# Patient Record
Sex: FEMALE | Race: BLACK OR AFRICAN AMERICAN | NOT HISPANIC OR LATINO | Employment: FULL TIME
[De-identification: names, ages, dates, MRNs, and addresses within clinical notes are randomized per-mention and may not be internally consistent; named-entity substitution may affect disease eponyms.]

---

## 2017-09-10 ENCOUNTER — HEALTH MAINTENANCE LETTER (OUTPATIENT)
Age: 42
End: 2017-09-10

## 2019-11-19 ENCOUNTER — APPOINTMENT (OUTPATIENT)
Dept: ULTRASOUND IMAGING | Facility: CLINIC | Age: 44
End: 2019-11-19
Attending: EMERGENCY MEDICINE
Payer: COMMERCIAL

## 2019-11-19 ENCOUNTER — HOSPITAL ENCOUNTER (EMERGENCY)
Facility: CLINIC | Age: 44
Discharge: HOME | End: 2019-11-20
Attending: EMERGENCY MEDICINE | Admitting: EMERGENCY MEDICINE
Payer: COMMERCIAL

## 2019-11-19 ENCOUNTER — APPOINTMENT (OUTPATIENT)
Dept: GENERAL RADIOLOGY | Facility: CLINIC | Age: 44
End: 2019-11-19
Attending: EMERGENCY MEDICINE
Payer: COMMERCIAL

## 2019-11-19 DIAGNOSIS — E87.6 HYPOKALEMIA: ICD-10-CM

## 2019-11-19 DIAGNOSIS — M54.50 ACUTE BILATERAL LOW BACK PAIN WITHOUT SCIATICA: ICD-10-CM

## 2019-11-19 DIAGNOSIS — R60.0 BILATERAL LOWER EXTREMITY EDEMA: ICD-10-CM

## 2019-11-19 LAB
ANION GAP SERPL CALCULATED.3IONS-SCNC: 6 MMOL/L (ref 3–14)
BASOPHILS # BLD AUTO: 0 10E9/L (ref 0–0.2)
BASOPHILS NFR BLD AUTO: 0.3 %
BUN SERPL-MCNC: 9 MG/DL (ref 7–30)
CALCIUM SERPL-MCNC: 8.5 MG/DL (ref 8.5–10.1)
CHLORIDE SERPL-SCNC: 110 MMOL/L (ref 94–109)
CO2 SERPL-SCNC: 24 MMOL/L (ref 20–32)
CREAT SERPL-MCNC: 0.52 MG/DL (ref 0.52–1.04)
D DIMER PPP FEU-MCNC: 1 UG/ML FEU (ref 0–0.5)
DIFFERENTIAL METHOD BLD: ABNORMAL
EOSINOPHIL # BLD AUTO: 0.1 10E9/L (ref 0–0.7)
EOSINOPHIL NFR BLD AUTO: 2.9 %
ERYTHROCYTE [DISTWIDTH] IN BLOOD BY AUTOMATED COUNT: 13.8 % (ref 10–15)
GFR SERPL CREATININE-BSD FRML MDRD: >90 ML/MIN/{1.73_M2}
GLUCOSE SERPL-MCNC: 87 MG/DL (ref 70–99)
HCT VFR BLD AUTO: 32.4 % (ref 35–47)
HGB BLD-MCNC: 10.8 G/DL (ref 11.7–15.7)
IMM GRANULOCYTES # BLD: 0 10E9/L (ref 0–0.4)
IMM GRANULOCYTES NFR BLD: 0.6 %
LYMPHOCYTES # BLD AUTO: 1.6 10E9/L (ref 0.8–5.3)
LYMPHOCYTES NFR BLD AUTO: 45.3 %
MCH RBC QN AUTO: 30.7 PG (ref 26.5–33)
MCHC RBC AUTO-ENTMCNC: 33.3 G/DL (ref 31.5–36.5)
MCV RBC AUTO: 92 FL (ref 78–100)
MONOCYTES # BLD AUTO: 0.3 10E9/L (ref 0–1.3)
MONOCYTES NFR BLD AUTO: 7.6 %
NEUTROPHILS # BLD AUTO: 1.5 10E9/L (ref 1.6–8.3)
NEUTROPHILS NFR BLD AUTO: 43.3 %
NRBC # BLD AUTO: 0 10*3/UL
NRBC BLD AUTO-RTO: 0 /100
NT-PROBNP SERPL-MCNC: 57 PG/ML (ref 0–450)
PLATELET # BLD AUTO: 275 10E9/L (ref 150–450)
POTASSIUM SERPL-SCNC: 3 MMOL/L (ref 3.4–5.3)
RBC # BLD AUTO: 3.52 10E12/L (ref 3.8–5.2)
SODIUM SERPL-SCNC: 140 MMOL/L (ref 133–144)
TROPONIN I SERPL-MCNC: <0.015 UG/L (ref 0–0.04)
WBC # BLD AUTO: 3.4 10E9/L (ref 4–11)

## 2019-11-19 PROCEDURE — 71046 X-RAY EXAM CHEST 2 VIEWS: CPT

## 2019-11-19 PROCEDURE — 85025 COMPLETE CBC W/AUTO DIFF WBC: CPT | Performed by: EMERGENCY MEDICINE

## 2019-11-19 PROCEDURE — 96366 THER/PROPH/DIAG IV INF ADDON: CPT

## 2019-11-19 PROCEDURE — 93970 EXTREMITY STUDY: CPT

## 2019-11-19 PROCEDURE — 83880 ASSAY OF NATRIURETIC PEPTIDE: CPT | Performed by: EMERGENCY MEDICINE

## 2019-11-19 PROCEDURE — 25000128 H RX IP 250 OP 636: Performed by: EMERGENCY MEDICINE

## 2019-11-19 PROCEDURE — 84484 ASSAY OF TROPONIN QUANT: CPT | Performed by: EMERGENCY MEDICINE

## 2019-11-19 PROCEDURE — 25000132 ZZH RX MED GY IP 250 OP 250 PS 637: Performed by: EMERGENCY MEDICINE

## 2019-11-19 PROCEDURE — 80048 BASIC METABOLIC PNL TOTAL CA: CPT | Performed by: EMERGENCY MEDICINE

## 2019-11-19 PROCEDURE — 96365 THER/PROPH/DIAG IV INF INIT: CPT | Mod: 59

## 2019-11-19 PROCEDURE — 73030 X-RAY EXAM OF SHOULDER: CPT | Mod: RT

## 2019-11-19 PROCEDURE — 85379 FIBRIN DEGRADATION QUANT: CPT | Performed by: EMERGENCY MEDICINE

## 2019-11-19 PROCEDURE — 93005 ELECTROCARDIOGRAM TRACING: CPT

## 2019-11-19 PROCEDURE — 99285 EMERGENCY DEPT VISIT HI MDM: CPT | Mod: 25

## 2019-11-19 RX ORDER — LEVETIRACETAM 500 MG/1
1000 TABLET ORAL 2 TIMES DAILY
COMMUNITY

## 2019-11-19 RX ORDER — POTASSIUM CL/LIDO/0.9 % NACL 10MEQ/0.1L
10 INTRAVENOUS SOLUTION, PIGGYBACK (ML) INTRAVENOUS ONCE
Status: COMPLETED | OUTPATIENT
Start: 2019-11-19 | End: 2019-11-20

## 2019-11-19 RX ORDER — LISINOPRIL 5 MG/1
5 TABLET ORAL DAILY
COMMUNITY

## 2019-11-19 RX ORDER — IBUPROFEN 600 MG/1
600 TABLET, FILM COATED ORAL ONCE
Status: COMPLETED | OUTPATIENT
Start: 2019-11-19 | End: 2019-11-19

## 2019-11-19 RX ORDER — POTASSIUM CHLORIDE 1.5 G/1.58G
40 POWDER, FOR SOLUTION ORAL ONCE
Status: COMPLETED | OUTPATIENT
Start: 2019-11-19 | End: 2019-11-19

## 2019-11-19 RX ADMIN — Medication 10 MEQ: at 23:10

## 2019-11-19 RX ADMIN — IBUPROFEN 600 MG: 600 TABLET ORAL at 23:10

## 2019-11-19 RX ADMIN — POTASSIUM CHLORIDE 40 MEQ: 1.5 POWDER, FOR SOLUTION ORAL at 23:10

## 2019-11-19 ASSESSMENT — MIFFLIN-ST. JEOR: SCORE: 1638.79

## 2019-11-19 ASSESSMENT — ENCOUNTER SYMPTOMS
FREQUENCY: 1
DYSURIA: 0
FEVER: 0
BACK PAIN: 1

## 2019-11-19 NOTE — ED AVS SNAPSHOT
Emergency Department  [address] [facility] [address]  Phone:  [phone]  Fax:  [phone]                                    Bernarda Leblanc   MRN: [id number]    Department:   Emergency Department   Date of Visit:  11/19/2019           After Visit Summary Signature Page    I have received my discharge instructions, and my questions have been answered. I have discussed any challenges I see with this plan with the nurse or doctor.    ..........................................................................................................................................  Patient/Patient Representative Signature      ..........................................................................................................................................  Patient Representative Print Name and Relationship to Patient    ..................................................               ................................................  Date                                   Time    ..........................................................................................................................................  Reviewed by Signature/Title    ...................................................              ..............................................  Date                                               Time          22EPIC Rev 08/18

## 2019-11-20 ENCOUNTER — APPOINTMENT (OUTPATIENT)
Dept: CT IMAGING | Facility: CLINIC | Age: 44
End: 2019-11-20
Attending: EMERGENCY MEDICINE
Payer: COMMERCIAL

## 2019-11-20 VITALS
SYSTOLIC BLOOD PRESSURE: 136 MMHG | RESPIRATION RATE: 16 BRPM | TEMPERATURE: 98.2 F | BODY MASS INDEX: 36.65 KG/M2 | HEIGHT: 65 IN | WEIGHT: 220 LBS | DIASTOLIC BLOOD PRESSURE: 79 MMHG | OXYGEN SATURATION: 98 % | HEART RATE: 98 BPM

## 2019-11-20 LAB
ALBUMIN UR-MCNC: NEGATIVE MG/DL
APPEARANCE UR: CLEAR
BILIRUB UR QL STRIP: NEGATIVE
COLOR UR AUTO: YELLOW
GLUCOSE UR STRIP-MCNC: NEGATIVE MG/DL
HGB UR QL STRIP: NEGATIVE
INTERPRETATION ECG - MUSE: NORMAL
KETONES UR STRIP-MCNC: NEGATIVE MG/DL
LEUKOCYTE ESTERASE UR QL STRIP: NEGATIVE
NITRATE UR QL: NEGATIVE
PH UR STRIP: 5.5 PH (ref 5–7)
SOURCE: NORMAL
SP GR UR STRIP: 1.02 (ref 1–1.03)
UROBILINOGEN UR STRIP-MCNC: NORMAL MG/DL (ref 0–2)

## 2019-11-20 PROCEDURE — 25000128 H RX IP 250 OP 636: Performed by: EMERGENCY MEDICINE

## 2019-11-20 PROCEDURE — 71275 CT ANGIOGRAPHY CHEST: CPT

## 2019-11-20 PROCEDURE — 25000125 ZZHC RX 250: Performed by: EMERGENCY MEDICINE

## 2019-11-20 PROCEDURE — 81003 URINALYSIS AUTO W/O SCOPE: CPT | Performed by: EMERGENCY MEDICINE

## 2019-11-20 RX ORDER — FUROSEMIDE 20 MG
20 TABLET ORAL DAILY
Qty: 3 TABLET | Refills: 0 | Status: SHIPPED | OUTPATIENT
Start: 2019-11-20 | End: 2022-02-21

## 2019-11-20 RX ORDER — IOPAMIDOL 755 MG/ML
77 INJECTION, SOLUTION INTRAVASCULAR ONCE
Status: COMPLETED | OUTPATIENT
Start: 2019-11-20 | End: 2019-11-20

## 2019-11-20 RX ORDER — POTASSIUM CHLORIDE 750 MG/1
10 TABLET, EXTENDED RELEASE ORAL 2 TIMES DAILY
Qty: 10 TABLET | Refills: 0 | Status: SHIPPED | OUTPATIENT
Start: 2019-11-20 | End: 2019-11-25

## 2019-11-20 RX ADMIN — IOPAMIDOL 77 ML: 755 INJECTION, SOLUTION INTRAVENOUS at 02:13

## 2019-11-20 RX ADMIN — SODIUM CHLORIDE 100 ML: 9 INJECTION, SOLUTION INTRAVENOUS at 02:14

## 2019-11-20 NOTE — ED PROVIDER NOTES
"History     Chief Complaint:  Back Pain and Leg Swelling     HPI  Bernarda Leblanc is a 44 year old female with history of hypertension who presents to the emergency department today with back pain and lag swelling. The patient states that for the last few weeks she has had increased leg swelling, which she was seen for on 10/25/19 with results below. She states since the swelling has decreased some, but the pain has increased with her leg locking up with a \"jannette horse\" feeling. She states has also been having bilateral lower back pain that has made it difficult for her to move. She states her job does not make her bend over, but her legs are always down. She states she does not wear compression leggings. She states she takes Lisinopril and Amlodipine for her hypertension, but has not taken the amlodipine for the last week as she has to refill her prescription. She states some shoulder soreness as well. She states she has alternated ibuprofen and tylenol for pain. She states she has also been wheezing of late and had some urinary frequency. She states she has talked to her PCP at [facility] about fluid removal but has no appointment set up as of yet due to difficulties getting work off. She denies fever, dysuria or foul smelling urine.     Results ED visit 10/25/19:  X-ray chest 2 views:   1. The lungs and costophrenic angles are clear.  Heart size and pulmonary vascularity are within normal limits.  There is no evidence of pneumothorax or pleural effusion. Bony thorax is unremarkable.     Laboratory Studies:   CBC with Differential: WBC 3.8 (WNL), Hgb 10.9 (L), Plts 263 (WNL), RBC 3.50(L), HCT 32.6 (L) o/w WNL  Basic Metabolic Panel: Cr. 0.57 (WNL), o/w WNL  Magnesium: 1.7 (WNL)  1306 Troponin: <0.01 (WNL)  1558 Troponin: <0.01 (WNL)  D-Dimer: 0.37 (WNL)  BNP: <10 (WNL)  TSH: 0.75   Type and Screen: O positive, Antibody negative     Allergies:  No Known Allergies     Medications:  " "  Keppra  Lisinopril  Flexeril  ibuprofen  Amlodipine    Past Medical History:    Seizures  hypertension  Opioid dependence    Past Surgical History:    Hysterectomy  Dental surgery  [place] teeth extraction  Gastric bypass  Tubal ligation    Family History:    Mother - seizures    Social History:  The patient was accompanied to the ED alone.  Smoking Status: Current  Smokeless Tobacco: Never  Alcohol Use: No   Drug Use: No   PCP: Austin Ash   Marital Status:  Single    Review of Systems   Constitutional: Negative for fever.   Cardiovascular: Positive for leg swelling.   Genitourinary: Positive for frequency. Negative for dysuria.   Musculoskeletal: Positive for back pain.   All other systems reviewed and are negative.         Physical Exam     Patient Vitals for the past 24 hrs:   BP Temp Temp src Pulse Resp SpO2 Height Weight   11/19/19 2007 (!) 142/80 98.2  F (36.8  C) Oral 98 18 97 % 1.651 m (5' 5\") 99.8 kg (220 lb)        Physical Exam  General: Patient is alert and normal appearing.  HEENT: Head atraumatic    Eyes: pupils equal and reactive. Conjunctiva clear   Nares: patent   Oropharynx: no lesions, uvula midline, no palatal draping, normal voice, no trismus  Neck: Supple without lymphadenopathy, no meningismus  Chest: Heart regular rate and rhythm.   Lungs: Equal clear to auscultation with no wheeze or rales  Abdomen: Soft, non tender, nondistended, normal bowel sounds  Back: No costovertebral angle tenderness, no midline C, T or L spine tenderness  Neuro: Grossly nonfocal, normal speech, strength equal bilaterally, CN 2-12 intact  Extremities: No deformities, equal radial and DP pulses. No clubbing, cyanosis.  Extremity that is tense but neurovascularly intact distally and compartments are soft.  Skin: Warm and dry with no rash.       Emergency Department Course     ECG:  Indication: leg swelling  Time: 2020  Vent. Rate 85 bpm. NY interval 156. QRS duration 94. QT/QTc 388/461. P-R-T axis 49 -8 2. Sinus " rhythm. Minimal voltage criteria for LVH, may be normal variant. Borderline ECG.     Imaging:  Radiology results were communicated with the patient who voiced understanding of the findings.    CT Chest Pulmonary embolism w/ contrast   IMPRESSION:  1. No visualized pulmonary embolus.  2. No evidence of active pulmonary disease, as per radiology.     US Lower Extremity Venous Duplex, bilateral:   IMPRESSION: No evidence of thrombus in the major veins of bilateral  lower extremities, as per radiology.    XR Shoulder right G/E, 3 views:    IMPRESSION: No visualized acute fracture, malalignment or other acute  osseous abnormality of the right shoulder, as per radiology.     XR Chest 2 views:   IMPRESSION: No evidence of active cardiopulmonary disease, as per radiology.     Laboratory:  Laboratory results were communicated with the patient who voiced understanding of the findings.    CBC: WBC: 3.4 (L), HGB: 10.8 (L), PLT: 275  BMP: Glucose: 87, Potassium: 3.0 (L), Chloride: 110 (H), o/w WNL (Creatinine: 0.52)  2016 Troponin: <0.015  D-dimer: 1.0 (H)  BNP: 57   UA: Negative     Interventions:  2310 Ibuprofen 600 mg PO  2310 Potassium chloride 40 mEq PO  2310 Potassium chloride with lidocaine 10 mEq IV    Emergency Department Course:  Nursing notes and vitals reviewed. (10:47 PM) I performed an exam of the patient as documented above.     IV inserted. Blood drawn. This was sent to the lab for further testing, results above.     Medicine administered as documented above.    The patient was sent for US, XR and CT while in the emergency department, results above.     0014 I rechecked the patient and discussed the results of their workup thus far.     Findings and plan explained to the Patient. Patient discharged home with instructions regarding supportive care, medications, and reasons to return. The importance of close follow-up was reviewed. The patient was prescribed Lasix and Potassium Chloride.    Impression & Plan   "    Medical Decision Making:  Patient is a 44-year-old female with multiple complaints including intermittent myalgias, lower extremity edema, bilateral low back pain and dysuria.  In addition patient complains of right shoulder pain.  She is hemodynamically stable and afebrile.  She sits working at a desk with her feet down all day.  She does have bilateral lower extremity edema.  She had a recent work-up in outside hospital that was relatively unremarkable and her d-dimer at that time was normal but today it is elevated.  Due to this she was sent for bilateral venous Dopplers which were negative for DVT.  As a result of her right shoulder pain as well she was sent for CT scan to rule out PE and this was thankfully negative as well.  Patient's EKG without evidence of ischemia.  In addition patient's BNP and troponin were negative.  Do not suspect congestive heart failure.  I feel her edema is likely dependent.  She was noted to be hypokalemic which may be contributing to her muscle spasms and myalgias.  She was given potassium repletion here in the emergency department.  Patient was very adamant that she felt she needed something to help her get rid of the \"water weight\".  She was given compression with Ace wraps here she states she cannot afford compression stockings at this time.  She is encouraged to keep her feet elevated.  She was given 3 days of 20 mg Lasix as well as potassium repletion.  No evidence of urinary tract infection.  I do not suspect kidney stone or other intra-abdominal emergency.  Follow-up with her primary doctor is encouraged.  Return precautions the emergency department reviewed at length.  Is with reasonable clinical certainty that I feel the patient is safe for discharge at this time.  She is agreeable with the plan and all questions and concerns addressed.      Diagnosis:    ICD-10-CM    1. Bilateral lower extremity edema R60.0 UA reflex to Microscopic and Culture   2. Acute bilateral low " back pain without sciatica M54.5    3. Hypokalemia E87.6       Disposition:  Discharged to home.    Discharge Medications:  New Prescriptions    FUROSEMIDE (LASIX) 20 MG TABLET    Take 1 tablet (20 mg) by mouth daily for 3 days    POTASSIUM CHLORIDE ER (K-DUR/KLOR-CON M) 10 MEQ CR TABLET    Take 1 tablet (10 mEq) by mouth 2 times daily for 5 days      Scribe Disclosure:  I, Forest Cartagena, am serving as a scribe at 10:47 PM on 11/19/2019 to document services personally performed by Jenny Frederick MD based on my observations and the provider's statements to me.      11/19/2019    EMERGENCY DEPARTMENT       Jenny Frederick MD  11/20/19 0344

## 2019-11-20 NOTE — ED TRIAGE NOTES
"Patient presents to the ED with complaints of back and bilateral flank pain, leg swelling, hip pain and wheezing when she lays down at night. Patient is on BP medications but has not taken them for a week or two since she has no way to get to the store to pick them up. She was recently seen at [facility] for same symptoms but says that \"they didn't do anything about it.   "

## 2022-02-11 ENCOUNTER — APPOINTMENT (OUTPATIENT)
Dept: ULTRASOUND IMAGING | Facility: CLINIC | Age: 47
End: 2022-02-11
Attending: EMERGENCY MEDICINE
Payer: COMMERCIAL

## 2022-02-11 ENCOUNTER — APPOINTMENT (OUTPATIENT)
Dept: CT IMAGING | Facility: CLINIC | Age: 47
End: 2022-02-11
Attending: EMERGENCY MEDICINE
Payer: COMMERCIAL

## 2022-02-11 ENCOUNTER — HOSPITAL ENCOUNTER (EMERGENCY)
Facility: CLINIC | Age: 47
Discharge: HOME | End: 2022-02-11
Attending: EMERGENCY MEDICINE | Admitting: EMERGENCY MEDICINE
Payer: COMMERCIAL

## 2022-02-11 VITALS
OXYGEN SATURATION: 100 % | HEIGHT: 65 IN | RESPIRATION RATE: 16 BRPM | SYSTOLIC BLOOD PRESSURE: 134 MMHG | TEMPERATURE: 98 F | DIASTOLIC BLOOD PRESSURE: 94 MMHG | BODY MASS INDEX: 36.65 KG/M2 | WEIGHT: 220 LBS | HEART RATE: 84 BPM

## 2022-02-11 DIAGNOSIS — K80.20 CALCULUS OF GALLBLADDER WITHOUT CHOLECYSTITIS WITHOUT OBSTRUCTION: ICD-10-CM

## 2022-02-11 DIAGNOSIS — N83.202 LEFT OVARIAN CYST: ICD-10-CM

## 2022-02-11 DIAGNOSIS — R10.9 RIGHT SIDED ABDOMINAL PAIN: ICD-10-CM

## 2022-02-11 LAB
ALBUMIN SERPL-MCNC: 3.2 G/DL (ref 3.4–5)
ALBUMIN UR-MCNC: NEGATIVE MG/DL
ALP SERPL-CCNC: 79 U/L (ref 40–150)
ALT SERPL W P-5'-P-CCNC: 25 U/L (ref 0–50)
ANION GAP SERPL CALCULATED.3IONS-SCNC: 7 MMOL/L (ref 3–14)
APPEARANCE UR: CLEAR
AST SERPL W P-5'-P-CCNC: 37 U/L (ref 0–45)
BASOPHILS # BLD AUTO: 0 10E3/UL (ref 0–0.2)
BASOPHILS NFR BLD AUTO: 1 %
BILIRUB SERPL-MCNC: 0.1 MG/DL (ref 0.2–1.3)
BILIRUB UR QL STRIP: NEGATIVE
BUN SERPL-MCNC: 7 MG/DL (ref 7–30)
CALCIUM SERPL-MCNC: 8.6 MG/DL (ref 8.5–10.1)
CHLORIDE BLD-SCNC: 107 MMOL/L (ref 94–109)
CO2 SERPL-SCNC: 24 MMOL/L (ref 20–32)
COLOR UR AUTO: NORMAL
CREAT SERPL-MCNC: 0.49 MG/DL (ref 0.52–1.04)
EOSINOPHIL # BLD AUTO: 0.1 10E3/UL (ref 0–0.7)
EOSINOPHIL NFR BLD AUTO: 3 %
ERYTHROCYTE [DISTWIDTH] IN BLOOD BY AUTOMATED COUNT: 13.6 % (ref 10–15)
GFR SERPL CREATININE-BSD FRML MDRD: >90 ML/MIN/1.73M2
GLUCOSE BLD-MCNC: 104 MG/DL (ref 70–99)
GLUCOSE UR STRIP-MCNC: NEGATIVE MG/DL
HCT VFR BLD AUTO: 33.6 % (ref 35–47)
HGB BLD-MCNC: 11.6 G/DL (ref 11.7–15.7)
HGB UR QL STRIP: NEGATIVE
IMM GRANULOCYTES # BLD: 0 10E3/UL
IMM GRANULOCYTES NFR BLD: 0 %
KETONES UR STRIP-MCNC: NEGATIVE MG/DL
LEUKOCYTE ESTERASE UR QL STRIP: NEGATIVE
LIPASE SERPL-CCNC: 80 U/L (ref 73–393)
LYMPHOCYTES # BLD AUTO: 1.6 10E3/UL (ref 0.8–5.3)
LYMPHOCYTES NFR BLD AUTO: 52 %
MCH RBC QN AUTO: 31.6 PG (ref 26.5–33)
MCHC RBC AUTO-ENTMCNC: 34.5 G/DL (ref 31.5–36.5)
MCV RBC AUTO: 92 FL (ref 78–100)
MONOCYTES # BLD AUTO: 0.4 10E3/UL (ref 0–1.3)
MONOCYTES NFR BLD AUTO: 11 %
NEUTROPHILS # BLD AUTO: 1 10E3/UL (ref 1.6–8.3)
NEUTROPHILS NFR BLD AUTO: 33 %
NITRATE UR QL: NEGATIVE
NRBC # BLD AUTO: 0 10E3/UL
NRBC BLD AUTO-RTO: 0 /100
PH UR STRIP: 5.5 [PH] (ref 5–7)
PLATELET # BLD AUTO: 256 10E3/UL (ref 150–450)
POTASSIUM BLD-SCNC: 3.5 MMOL/L (ref 3.4–5.3)
PROT SERPL-MCNC: 6.3 G/DL (ref 6.8–8.8)
RBC # BLD AUTO: 3.67 10E6/UL (ref 3.8–5.2)
RBC URINE: <1 /HPF
SODIUM SERPL-SCNC: 138 MMOL/L (ref 133–144)
SP GR UR STRIP: 1.01 (ref 1–1.03)
SQUAMOUS EPITHELIAL: <1 /HPF
UROBILINOGEN UR STRIP-MCNC: NORMAL MG/DL
WBC # BLD AUTO: 3.2 10E3/UL (ref 4–11)
WBC URINE: <1 /HPF

## 2022-02-11 PROCEDURE — 83690 ASSAY OF LIPASE: CPT | Performed by: EMERGENCY MEDICINE

## 2022-02-11 PROCEDURE — 81001 URINALYSIS AUTO W/SCOPE: CPT | Performed by: EMERGENCY MEDICINE

## 2022-02-11 PROCEDURE — 74176 CT ABD & PELVIS W/O CONTRAST: CPT

## 2022-02-11 PROCEDURE — 99285 EMERGENCY DEPT VISIT HI MDM: CPT | Mod: 25

## 2022-02-11 PROCEDURE — 36415 COLL VENOUS BLD VENIPUNCTURE: CPT | Performed by: EMERGENCY MEDICINE

## 2022-02-11 PROCEDURE — 96374 THER/PROPH/DIAG INJ IV PUSH: CPT

## 2022-02-11 PROCEDURE — 80053 COMPREHEN METABOLIC PANEL: CPT | Performed by: EMERGENCY MEDICINE

## 2022-02-11 PROCEDURE — 96375 TX/PRO/DX INJ NEW DRUG ADDON: CPT

## 2022-02-11 PROCEDURE — 85048 AUTOMATED LEUKOCYTE COUNT: CPT | Performed by: EMERGENCY MEDICINE

## 2022-02-11 PROCEDURE — 76705 ECHO EXAM OF ABDOMEN: CPT

## 2022-02-11 PROCEDURE — 250N000011 HC RX IP 250 OP 636: Performed by: EMERGENCY MEDICINE

## 2022-02-11 RX ORDER — KETOROLAC TROMETHAMINE 15 MG/ML
15 INJECTION, SOLUTION INTRAMUSCULAR; INTRAVENOUS ONCE
Status: COMPLETED | OUTPATIENT
Start: 2022-02-11 | End: 2022-02-11

## 2022-02-11 RX ORDER — HYDROMORPHONE HYDROCHLORIDE 1 MG/ML
0.5 INJECTION, SOLUTION INTRAMUSCULAR; INTRAVENOUS; SUBCUTANEOUS ONCE
Status: COMPLETED | OUTPATIENT
Start: 2022-02-11 | End: 2022-02-11

## 2022-02-11 RX ORDER — OXYCODONE AND ACETAMINOPHEN 5; 325 MG/1; MG/1
1 TABLET ORAL EVERY 6 HOURS PRN
Qty: 6 TABLET | Refills: 0 | Status: SHIPPED | OUTPATIENT
Start: 2022-02-11 | End: 2022-02-14

## 2022-02-11 RX ADMIN — HYDROMORPHONE HYDROCHLORIDE 0.5 MG: 1 INJECTION, SOLUTION INTRAMUSCULAR; INTRAVENOUS; SUBCUTANEOUS at 22:11

## 2022-02-11 RX ADMIN — KETOROLAC TROMETHAMINE 15 MG: 15 INJECTION, SOLUTION INTRAMUSCULAR; INTRAVENOUS at 20:57

## 2022-02-11 ASSESSMENT — ENCOUNTER SYMPTOMS
FREQUENCY: 1
VOMITING: 0
FLANK PAIN: 1
ABDOMINAL PAIN: 0
FEVER: 0
CHILLS: 0
SHORTNESS OF BREATH: 0

## 2022-02-11 ASSESSMENT — MIFFLIN-ST. JEOR: SCORE: 1628.79

## 2022-02-11 NOTE — LETTER
February 11, 2022      To Whom It May Concern:      Bernarda Leblanc was seen in our Emergency Department today, 02/11/22.  I expect her condition to improve over the next 3-4 days.  She may return to work/school when improved.    Sincerely,        Edwardo Villarreal MD

## 2022-02-12 NOTE — ED TRIAGE NOTES
Pt here form home for back pain that radiates down her L leg. PT states her symptoms started 2-3 days ago. Pt Aox4.vss.

## 2022-02-12 NOTE — ED PROVIDER NOTES
History   Chief Complaint:  Flank pain     The history is provided by the patient.      Bernarda Leblanc is a 46 year old female with history of seizures, hypertension, secondary amenorrhea, and herniated lumbar intervertebral disc who presents with right flank pain. For a few weeks Bernarda has been having constant right side pain. She says that her pain has worsened significantly in the past two days. She has taken tylenol for the pain which helps. Pain radiates down the legs and has caused some leg swelling. She also had one episode of diarrhea and increased urinary frequency with decreased output.    Recently her aunt passed away. Bernarda expresses concern here at bedside because her aunt had the same symptoms that she presents with now. Denies constipation, fever, chills, vomiting, or abdominal pain. No chest pain or shortness of breath. No history of kidney stones.      Review of Systems   Constitutional: Negative for chills and fever.   Respiratory: Negative for shortness of breath.    Cardiovascular: Negative for chest pain.   Gastrointestinal: Negative for abdominal pain and vomiting.   Genitourinary: Positive for flank pain and frequency.   All other systems reviewed and are negative.      Allergies:  No Known Allergies    Medications:  Flexeril  Loniten  Lyrica  Senokot  Lisinopril  Vitamin D3  Lasix  Keppra  Constulose  Ambien  Therapeutic multivitamin  Cymbalta  Ferrous sulfate  Norvasc  Flovent  Albuterol    Past Medical History:     Blood pressure elevated  Substance abuse  essential hypertension  Sleep apnea, obstructive  Secondary amenorrhea  Migraine  perimenopause  Opioid dependence   Herniated lumbar intervertebral disc  TUCKER  Nicotine dependence  Mid back pain  Anemia  Vitamin D deficiency      Past Surgical History:    Hysterectomy  Tubal ligation  Gastric bypass    Social History:  Patient unaccompanied  PCP: [facility]     Physical Exam     Patient Vitals for  "the past 24 hrs:   BP Temp Temp src Pulse Resp SpO2 Height Weight   02/11/22 2330 (!) 134/94 98  F (36.7  C) Oral 84 16 100 % -- --   02/11/22 2300 129/75 -- -- -- -- 99 % -- --   02/11/22 1945 131/82 97.5  F (36.4  C) Temporal 101 16 98 % 1.651 m (5' 5\") 99.8 kg (220 lb)       Physical Exam  Eyes:  The pupils are equal and round    Conjunctivae and sclerae are normal  ENT:    The nose is normal    Pinnae are normal  CV:  Regular rate and rhythm     No edema  Resp:  Lungs are clear    Non-labored    No rales    No wheezing   GI:  Abdomen is soft with right-sided tenderness, there is no rigidity    No distension    No rebound tenderness   MS:  Normal muscular tone    No asymmetric leg swelling  Skin:  No rash or acute skin lesions noted  Neuro:   Awake, alert.      Speech is normal and fluent.    Face is symmetric.     Moves all extremities      Emergency Department Course     Imaging:  US Abdomen Limited (RUQ)   Final Result   IMPRESSION:   1.  Gallbladder is distended with several gallstones and some tumefactive sludge. No wall thickening or pericholecystic fluid.            CT Abdomen Pelvis w/o Contrast   Final Result   IMPRESSION:    1.  4 cm left ovarian cyst.      2.  Cholelithiasis      3.  Previous gastric bypass.      4.  Hysterectomy.           Report per radiology    Laboratory:  Labs Ordered and Resulted from Time of ED Arrival to Time of ED Departure   COMPREHENSIVE METABOLIC PANEL - Abnormal       Result Value    Sodium 138      Potassium 3.5      Chloride 107      Carbon Dioxide (CO2) 24      Anion Gap 7      Urea Nitrogen 7      Creatinine 0.49 (*)     Calcium 8.6      Glucose 104 (*)     Alkaline Phosphatase 79      AST 37      ALT 25      Protein Total 6.3 (*)     Albumin 3.2 (*)     Bilirubin Total 0.1 (*)     GFR Estimate >90     CBC WITH PLATELETS AND DIFFERENTIAL - Abnormal    WBC Count 3.2 (*)     RBC Count 3.67 (*)     Hemoglobin 11.6 (*)     Hematocrit 33.6 (*)     MCV 92      MCH 31.6      " MCHC 34.5      RDW 13.6      Platelet Count 256      % Neutrophils 33      % Lymphocytes 52      % Monocytes 11      % Eosinophils 3      % Basophils 1      % Immature Granulocytes 0      NRBCs per 100 WBC 0      Absolute Neutrophils 1.0 (*)     Absolute Lymphocytes 1.6      Absolute Monocytes 0.4      Absolute Eosinophils 0.1      Absolute Basophils 0.0      Absolute Immature Granulocytes 0.0      Absolute NRBCs 0.0     LIPASE - Normal    Lipase 80     ROUTINE UA WITH MICROSCOPIC REFLEX TO CULTURE - Normal    Color Urine Light Yellow      Appearance Urine Clear      Glucose Urine Negative      Bilirubin Urine Negative      Ketones Urine Negative      Specific Gravity Urine 1.012      Blood Urine Negative      pH Urine 5.5      Protein Albumin Urine Negative      Urobilinogen Urine Normal      Nitrite Urine Negative      Leukocyte Esterase Urine Negative      RBC Urine <1      WBC Urine <1      Squamous Epithelials Urine <1        Emergency Department Course:           Reviewed:  I reviewed nursing notes, vitals, past medical history and Care Everywhere    Assessments/Consults:  ED Course as of 02/12/22 0033 Fri Feb 11, 2022 2033 Obtained history and examined the patient as noted above.      Interventions:  2057 Toradol 15 mg, IV  2058 NS 1000 mL, IV  2211 Dilaudid 0.5 mg, IV    Disposition:  The patient was discharged to home.     Impression & Plan     CMS Diagnoses: None    Medical Decision Making:  Bernarda Leblanc is a 46 year old female who presents to the emergency department with right-sided abdominal pain/flank pain.  She notes that the symptoms have been ongoing for the past couple of weeks, but have worsened over the past 2 days.  On exam her pain seems to be on the lateral portion of her abdomen.  No respiratory difficulty or chest pain.  Initial concern for possible kidney stone given her description of the pain wrapping down towards her pelvis, although she also has also some pains going  down to her leg.  Laboratory work-up overall is normal with a mildly low white blood cell count consistent with prior.  CT scan of the abdomen pelvis did not reveal any ureterolithiasis; however, there was findings concerning for cholelithiasis.  Patient reexamined that she does have some tenderness in her right upper abdomen as well, so a ultrasound of her gallbladder was obtained.  Gallbladder ultrasound did not reveal any thickening of the gallbladder wall.  After pain medication here patient noted that her pain was significantly improved.  Given this, I think she is appropriate for further outpatient management of this pain.  She is given information for follow-up with surgery.  She does have a large cyst on her left ovary.  She is not having any pain in this location at this time.  I discussed with her the importance of following up for imaging for this cyst as it is quite large.  I also explained her that large cyst like this can lead to development of ovarian torsion.  She verbalized understanding and need to return for severe pain.  She is given several tablets of pain medication use at home in case the pain recurs.  She is encouraged return with any new or worrisome symptoms.      Diagnosis:    ICD-10-CM    1. Right sided abdominal pain  R10.9    2. Calculus of gallbladder without cholecystitis without obstruction  K80.20    3. Left ovarian cyst  N83.202        Discharge Medications:  Discharge Medication List as of 2/11/2022 11:47 PM      START taking these medications    Details   oxyCODONE-acetaminophen (PERCOCET) 5-325 MG tablet Take 1 tablet by mouth every 6 hours as needed for pain, Disp-6 tablet, R-0, Local Print             Scribe Disclosure:  I, True Jasso, am serving as a scribe at 8:26 PM on 2/11/2022 to document services personally performed by Edwardo Villarreal MD based on my observations and the provider's statements to me.           Edwardo Villarreal MD  02/12/22 0135

## 2022-02-21 ENCOUNTER — HOSPITAL ENCOUNTER (OUTPATIENT)
Facility: CLINIC | Age: 47
End: 2022-02-21
Attending: SURGERY | Admitting: SURGERY
Payer: COMMERCIAL

## 2022-02-21 ENCOUNTER — OFFICE VISIT (OUTPATIENT)
Dept: SURGERY | Facility: CLINIC | Age: 47
End: 2022-02-21
Payer: COMMERCIAL

## 2022-02-21 ENCOUNTER — TELEPHONE (OUTPATIENT)
Dept: SURGERY | Facility: CLINIC | Age: 47
End: 2022-02-21

## 2022-02-21 VITALS
WEIGHT: 204 LBS | HEIGHT: 65 IN | SYSTOLIC BLOOD PRESSURE: 132 MMHG | BODY MASS INDEX: 33.99 KG/M2 | DIASTOLIC BLOOD PRESSURE: 84 MMHG

## 2022-02-21 DIAGNOSIS — K80.20 CALCULUS OF GALLBLADDER WITHOUT CHOLECYSTITIS WITHOUT OBSTRUCTION: Primary | ICD-10-CM

## 2022-02-21 PROCEDURE — 99204 OFFICE O/P NEW MOD 45 MIN: CPT | Performed by: SURGERY

## 2022-02-21 RX ORDER — DULOXETIN HYDROCHLORIDE 60 MG/1
120 CAPSULE, DELAYED RELEASE ORAL DAILY
COMMUNITY

## 2022-02-21 NOTE — LETTER
"February 24, 2022        [facility]        RE:   Bernarda Leblanc 1975      Dear Colleague,    Thank you for referring your patient, Bernarda Leblanc, to [facility] at [facility]. Please see a copy of my visit note below.    This is a 46-year-old female referred by the above-mentioned provider for consultation regarding cholelithiasis.  She was recently in the emergency department whereby she was diagnosed with a urinary tract infection and likely pyelonephritis.  She was having right-sided flank pain.  She had some diarrhea associated with this.  She had some fevers or chills.  She also gives a longstanding history of postprandial right upper quadrant pain with radiation to her back.  Some associated nausea with these episodes.  These seem to be triggered by certain foods.  She has had continued discomfort since her ER visit.     PMH:   has a past medical history of Asthma, Benign essential hypertension, Disorder of thyroid, and Sleep apnea.  PSH:    has a past surgical history that includes GYN surgery and gastric bypass.  Social History:   reports that she has been smoking. She has been smoking about 0.50 packs per day. She has never used smokeless tobacco. She reports that she does not drink alcohol and does not use drugs.  Family History:  family history includes Diabetes in her daughter; Hypertension in her father and mother.  Medications/Allergies: Home medications and allergies reviewed.     ROS:  The 10 point Review of Systems is negative other than noted in the HPI.     Physical Exam:  /84   Ht 1.651 m (5' 5\")   Wt 92.5 kg (204 lb)   BMI 33.95 kg/m    GENERAL: Generally appears well.  Psych: Alert and Oriented.  Normal affect  Eyes: Sclera clear  Respiratory:  Lungs clear to ausculation bilaterally with good air excursion  Cardiovascular:  Regular Rate and Rhythm with no murmurs gallops or rubs, normal peripheral pulses  GI: Abdomen Non Distended " Soft Mild tenderness to palpation RUQ No hernias palpated.  Lymphatic/Hematologic/Immune:  No femoral or cervical lymphadenopathy.  Integumentary:  No rashes  Neurological: grossly intact     Ultrasound shows Cholelithiasis No gall bladder wall thicking  No pericholecystic fluid   All new lab and imaging data was reviewed.      Impression and Plan:  Patient is a 46 year old female with cholelithiasis.     PLAN:   I discussed the pathophysiology of gallbladder disease and complications of cholecystitis and choledocholithiasis with the patient.  Plan for elective laparoscopic cholecystectomy in the near future.  I also discussed the risks associated with the procedure including, but not limited to infection, bleeding, conversion to open, bile leak, bile duct injury, retained gallstones, pneumonia, MI, and anesthesia complications with the patient.  I also discussed if a complication did occur it may require further surgical intervention during or after the procedure. The patient indicated understanding of the discussion, asked appropriate questions, and provided consent. I provided the patient an information pamphlet. I have recommended a low fat diet and instructed the patient to go to ER if they developed persistent pain, persistent nausea and vomiting, or yellowness of skin.       Again, thank you for allowing me to participate in the care of your patient.      Sincerely,      Conner Pereira MD

## 2022-02-21 NOTE — TELEPHONE ENCOUNTER
Orders received for Lap jac with Dr. Conner Pereira.       Left message for patient to call me at her convenience to schedule surgery.     Edilma RICE    Surgery Coordinator  [facility]  [facility]  [phone]

## 2022-02-22 ENCOUNTER — TELEPHONE (OUTPATIENT)
Dept: SURGERY | Facility: CLINIC | Age: 47
End: 2022-02-22
Payer: COMMERCIAL

## 2022-02-22 DIAGNOSIS — Z11.59 ENCOUNTER FOR SCREENING FOR OTHER VIRAL DISEASES: Primary | ICD-10-CM

## 2022-02-22 RX ORDER — CEFAZOLIN SODIUM/WATER 2 G/20 ML
2 SYRINGE (ML) INTRAVENOUS
Status: CANCELLED | OUTPATIENT
Start: 2022-02-22

## 2022-02-22 RX ORDER — CEFAZOLIN SODIUM/WATER 2 G/20 ML
2 SYRINGE (ML) INTRAVENOUS SEE ADMIN INSTRUCTIONS
Status: CANCELLED | OUTPATIENT
Start: 2022-02-22

## 2022-02-22 NOTE — TELEPHONE ENCOUNTER
Type of surgery: Lap jac  Location of surgery: [facility]  Date and time of surgery: 2/28/22 at 8:30am  Surgeon: Dr. Conner Pereira  Pre-Op Appt Date: Patient to schedule  Post-Op Appt Date: Patient to schedule   Packet sent out: Yes  Pre-cert/Authorization completed:  Not Applicable  Date: 2/22/22

## 2022-02-22 NOTE — TELEPHONE ENCOUNTER
Type of surgery: Lap jac  Location of surgery: [facility]  Date and time of surgery: 2/28/22 at 8:30am  Surgeon: Dr. Conner Pereira  Pre-Op Appt Date: Patient to schedule  Post-Op Appt Date: Patient to schedule   Packet sent out: Yes  Pre-cert/Authorization completed:  Not Applicable  Date: 2/22/22

## 2022-02-23 ENCOUNTER — HOSPITAL ENCOUNTER (OUTPATIENT)
Facility: CLINIC | Age: 47
Setting detail: OBSERVATION
Discharge: HOME | End: 2022-02-26
Attending: EMERGENCY MEDICINE | Admitting: HOSPITALIST
Payer: COMMERCIAL

## 2022-02-23 ENCOUNTER — APPOINTMENT (OUTPATIENT)
Dept: ULTRASOUND IMAGING | Facility: CLINIC | Age: 47
End: 2022-02-23
Attending: EMERGENCY MEDICINE
Payer: COMMERCIAL

## 2022-02-23 DIAGNOSIS — K80.50 BILIARY COLIC: ICD-10-CM

## 2022-02-23 DIAGNOSIS — G89.18 POSTOPERATIVE PAIN: ICD-10-CM

## 2022-02-23 DIAGNOSIS — K80.20 GALLSTONES: ICD-10-CM

## 2022-02-23 DIAGNOSIS — K80.20 SYMPTOMATIC CHOLELITHIASIS: Primary | ICD-10-CM

## 2022-02-23 DIAGNOSIS — G89.4 CHRONIC PAIN SYNDROME: ICD-10-CM

## 2022-02-23 LAB
ALBUMIN SERPL-MCNC: 3.4 G/DL (ref 3.4–5)
ALBUMIN UR-MCNC: NEGATIVE MG/DL
ALP SERPL-CCNC: 88 U/L (ref 40–150)
ALT SERPL W P-5'-P-CCNC: 26 U/L (ref 0–50)
ANION GAP SERPL CALCULATED.3IONS-SCNC: 7 MMOL/L (ref 3–14)
APPEARANCE UR: CLEAR
AST SERPL W P-5'-P-CCNC: 22 U/L (ref 0–45)
BASOPHILS # BLD AUTO: 0 10E3/UL (ref 0–0.2)
BASOPHILS NFR BLD AUTO: 0 %
BILIRUB SERPL-MCNC: 0.1 MG/DL (ref 0.2–1.3)
BILIRUB UR QL STRIP: NEGATIVE
BUN SERPL-MCNC: 5 MG/DL (ref 7–30)
CALCIUM SERPL-MCNC: 8.7 MG/DL (ref 8.5–10.1)
CHLORIDE BLD-SCNC: 109 MMOL/L (ref 94–109)
CO2 SERPL-SCNC: 24 MMOL/L (ref 20–32)
COLOR UR AUTO: NORMAL
CREAT SERPL-MCNC: 0.64 MG/DL (ref 0.52–1.04)
EOSINOPHIL # BLD AUTO: 0.1 10E3/UL (ref 0–0.7)
EOSINOPHIL NFR BLD AUTO: 3 %
ERYTHROCYTE [DISTWIDTH] IN BLOOD BY AUTOMATED COUNT: 13.7 % (ref 10–15)
GFR SERPL CREATININE-BSD FRML MDRD: >90 ML/MIN/1.73M2
GLUCOSE BLD-MCNC: 69 MG/DL (ref 70–99)
GLUCOSE BLDC GLUCOMTR-MCNC: 106 MG/DL (ref 70–99)
GLUCOSE UR STRIP-MCNC: NEGATIVE MG/DL
HCT VFR BLD AUTO: 38.8 % (ref 35–47)
HGB BLD-MCNC: 12.8 G/DL (ref 11.7–15.7)
HGB UR QL STRIP: NEGATIVE
IMM GRANULOCYTES # BLD: 0 10E3/UL
IMM GRANULOCYTES NFR BLD: 1 %
KETONES UR STRIP-MCNC: NEGATIVE MG/DL
LEUKOCYTE ESTERASE UR QL STRIP: NEGATIVE
LIPASE SERPL-CCNC: 79 U/L (ref 73–393)
LYMPHOCYTES # BLD AUTO: 2.2 10E3/UL (ref 0.8–5.3)
LYMPHOCYTES NFR BLD AUTO: 58 %
MCH RBC QN AUTO: 31.3 PG (ref 26.5–33)
MCHC RBC AUTO-ENTMCNC: 33 G/DL (ref 31.5–36.5)
MCV RBC AUTO: 95 FL (ref 78–100)
MONOCYTES # BLD AUTO: 0.3 10E3/UL (ref 0–1.3)
MONOCYTES NFR BLD AUTO: 8 %
NEUTROPHILS # BLD AUTO: 1.1 10E3/UL (ref 1.6–8.3)
NEUTROPHILS NFR BLD AUTO: 30 %
NITRATE UR QL: NEGATIVE
NRBC # BLD AUTO: 0 10E3/UL
NRBC BLD AUTO-RTO: 0 /100
PH UR STRIP: 5.5 [PH] (ref 5–7)
PLATELET # BLD AUTO: 318 10E3/UL (ref 150–450)
POTASSIUM BLD-SCNC: 3.5 MMOL/L (ref 3.4–5.3)
PROT SERPL-MCNC: 6.7 G/DL (ref 6.8–8.8)
RBC # BLD AUTO: 4.09 10E6/UL (ref 3.8–5.2)
RBC URINE: <1 /HPF
SARS-COV-2 RNA RESP QL NAA+PROBE: NEGATIVE
SODIUM SERPL-SCNC: 140 MMOL/L (ref 133–144)
SP GR UR STRIP: 1.01 (ref 1–1.03)
UROBILINOGEN UR STRIP-MCNC: NORMAL MG/DL
WBC # BLD AUTO: 3.7 10E3/UL (ref 4–11)
WBC URINE: 0 /HPF

## 2022-02-23 PROCEDURE — 85025 COMPLETE CBC W/AUTO DIFF WBC: CPT | Performed by: EMERGENCY MEDICINE

## 2022-02-23 PROCEDURE — 87635 SARS-COV-2 COVID-19 AMP PRB: CPT | Performed by: EMERGENCY MEDICINE

## 2022-02-23 PROCEDURE — 81001 URINALYSIS AUTO W/SCOPE: CPT | Performed by: EMERGENCY MEDICINE

## 2022-02-23 PROCEDURE — 96375 TX/PRO/DX INJ NEW DRUG ADDON: CPT

## 2022-02-23 PROCEDURE — 96361 HYDRATE IV INFUSION ADD-ON: CPT

## 2022-02-23 PROCEDURE — 99285 EMERGENCY DEPT VISIT HI MDM: CPT | Mod: 25

## 2022-02-23 PROCEDURE — 76705 ECHO EXAM OF ABDOMEN: CPT

## 2022-02-23 PROCEDURE — 250N000011 HC RX IP 250 OP 636: Performed by: EMERGENCY MEDICINE

## 2022-02-23 PROCEDURE — 83690 ASSAY OF LIPASE: CPT | Performed by: EMERGENCY MEDICINE

## 2022-02-23 PROCEDURE — 36415 COLL VENOUS BLD VENIPUNCTURE: CPT | Performed by: EMERGENCY MEDICINE

## 2022-02-23 PROCEDURE — 99220 PR INITIAL OBSERVATION CARE,LEVEL III: CPT | Performed by: HOSPITALIST

## 2022-02-23 PROCEDURE — C9803 HOPD COVID-19 SPEC COLLECT: HCPCS

## 2022-02-23 PROCEDURE — G0378 HOSPITAL OBSERVATION PER HR: HCPCS

## 2022-02-23 PROCEDURE — 258N000003 HC RX IP 258 OP 636: Performed by: EMERGENCY MEDICINE

## 2022-02-23 PROCEDURE — 96374 THER/PROPH/DIAG INJ IV PUSH: CPT

## 2022-02-23 PROCEDURE — 96376 TX/PRO/DX INJ SAME DRUG ADON: CPT

## 2022-02-23 PROCEDURE — 82374 ASSAY BLOOD CARBON DIOXIDE: CPT | Performed by: EMERGENCY MEDICINE

## 2022-02-23 RX ORDER — PREGABALIN 100 MG/1
100 CAPSULE ORAL EVERY MORNING
COMMUNITY

## 2022-02-23 RX ORDER — AMLODIPINE BESYLATE 5 MG/1
5 TABLET ORAL DAILY
COMMUNITY

## 2022-02-23 RX ORDER — ZOLPIDEM TARTRATE 12.5 MG/1
12.5 TABLET, FILM COATED, EXTENDED RELEASE ORAL
COMMUNITY

## 2022-02-23 RX ORDER — BUPRENORPHINE AND NALOXONE 8; 2 MG/1; MG/1
2 FILM, SOLUBLE BUCCAL; SUBLINGUAL EVERY EVENING
Status: ON HOLD | COMMUNITY
End: 2022-02-24

## 2022-02-23 RX ORDER — MULTIVITAMIN,THERAPEUTIC
1 TABLET ORAL DAILY
COMMUNITY

## 2022-02-23 RX ORDER — ONDANSETRON 2 MG/ML
4 INJECTION INTRAMUSCULAR; INTRAVENOUS ONCE
Status: COMPLETED | OUTPATIENT
Start: 2022-02-23 | End: 2022-02-23

## 2022-02-23 RX ORDER — MINOXIDIL 2.5 MG/1
5 TABLET ORAL DAILY
COMMUNITY

## 2022-02-23 RX ORDER — SUMATRIPTAN 100 MG/1
100 TABLET, FILM COATED ORAL
COMMUNITY
End: 2022-03-09

## 2022-02-23 RX ORDER — SENNOSIDES A AND B 8.6 MG/1
2 TABLET, FILM COATED ORAL DAILY
COMMUNITY

## 2022-02-23 RX ORDER — HYDROMORPHONE HYDROCHLORIDE 1 MG/ML
0.5 INJECTION, SOLUTION INTRAMUSCULAR; INTRAVENOUS; SUBCUTANEOUS ONCE
Status: COMPLETED | OUTPATIENT
Start: 2022-02-23 | End: 2022-02-23

## 2022-02-23 RX ORDER — KETOROLAC TROMETHAMINE 15 MG/ML
15 INJECTION, SOLUTION INTRAMUSCULAR; INTRAVENOUS ONCE
Status: COMPLETED | OUTPATIENT
Start: 2022-02-23 | End: 2022-02-23

## 2022-02-23 RX ORDER — BUPRENORPHINE AND NALOXONE 12; 3 MG/1; MG/1
1 FILM, SOLUBLE BUCCAL; SUBLINGUAL EVERY MORNING
Status: ON HOLD | COMMUNITY
End: 2022-02-24

## 2022-02-23 RX ADMIN — ONDANSETRON 4 MG: 2 INJECTION INTRAMUSCULAR; INTRAVENOUS at 18:54

## 2022-02-23 RX ADMIN — HYDROMORPHONE HYDROCHLORIDE 0.5 MG: 1 INJECTION, SOLUTION INTRAMUSCULAR; INTRAVENOUS; SUBCUTANEOUS at 18:54

## 2022-02-23 RX ADMIN — HYDROMORPHONE HYDROCHLORIDE 0.5 MG: 1 INJECTION, SOLUTION INTRAMUSCULAR; INTRAVENOUS; SUBCUTANEOUS at 23:49

## 2022-02-23 RX ADMIN — SODIUM CHLORIDE 1000 ML: 9 INJECTION, SOLUTION INTRAVENOUS at 18:54

## 2022-02-23 RX ADMIN — KETOROLAC TROMETHAMINE 15 MG: 15 INJECTION, SOLUTION INTRAMUSCULAR; INTRAVENOUS at 20:15

## 2022-02-23 ASSESSMENT — ENCOUNTER SYMPTOMS
VOMITING: 0
FEVER: 0
SHORTNESS OF BREATH: 0
ABDOMINAL PAIN: 1
COUGH: 0
SORE THROAT: 0
DYSURIA: 0
BACK PAIN: 1
FREQUENCY: 1

## 2022-02-24 ENCOUNTER — ANESTHESIA EVENT (OUTPATIENT)
Dept: SURGERY | Facility: CLINIC | Age: 47
End: 2022-02-24
Payer: COMMERCIAL

## 2022-02-24 ENCOUNTER — ANESTHESIA (OUTPATIENT)
Dept: SURGERY | Facility: CLINIC | Age: 47
End: 2022-02-24
Payer: COMMERCIAL

## 2022-02-24 LAB
ALBUMIN SERPL-MCNC: 2.8 G/DL (ref 3.4–5)
ALP SERPL-CCNC: 66 U/L (ref 40–150)
ALT SERPL W P-5'-P-CCNC: 21 U/L (ref 0–50)
ANION GAP SERPL CALCULATED.3IONS-SCNC: 2 MMOL/L (ref 3–14)
AST SERPL W P-5'-P-CCNC: 17 U/L (ref 0–45)
BILIRUB SERPL-MCNC: 0.2 MG/DL (ref 0.2–1.3)
BUN SERPL-MCNC: 7 MG/DL (ref 7–30)
CALCIUM SERPL-MCNC: 8.1 MG/DL (ref 8.5–10.1)
CHLORIDE BLD-SCNC: 108 MMOL/L (ref 94–109)
CO2 SERPL-SCNC: 29 MMOL/L (ref 20–32)
CREAT SERPL-MCNC: 0.51 MG/DL (ref 0.52–1.04)
ERYTHROCYTE [DISTWIDTH] IN BLOOD BY AUTOMATED COUNT: 13.6 % (ref 10–15)
GFR SERPL CREATININE-BSD FRML MDRD: >90 ML/MIN/1.73M2
GLUCOSE BLD-MCNC: 77 MG/DL (ref 70–99)
HCT VFR BLD AUTO: 33 % (ref 35–47)
HGB BLD-MCNC: 11.2 G/DL (ref 11.7–15.7)
MCH RBC QN AUTO: 31.5 PG (ref 26.5–33)
MCHC RBC AUTO-ENTMCNC: 33.9 G/DL (ref 31.5–36.5)
MCV RBC AUTO: 93 FL (ref 78–100)
PLATELET # BLD AUTO: 269 10E3/UL (ref 150–450)
POTASSIUM BLD-SCNC: 3.4 MMOL/L (ref 3.4–5.3)
PROT SERPL-MCNC: 5.7 G/DL (ref 6.8–8.8)
RBC # BLD AUTO: 3.56 10E6/UL (ref 3.8–5.2)
SODIUM SERPL-SCNC: 139 MMOL/L (ref 133–144)
WBC # BLD AUTO: 3.2 10E3/UL (ref 4–11)

## 2022-02-24 PROCEDURE — 96376 TX/PRO/DX INJ SAME DRUG ADON: CPT | Mod: 59

## 2022-02-24 PROCEDURE — 710N000009 HC RECOVERY PHASE 1, LEVEL 1, PER MIN: Performed by: SURGERY

## 2022-02-24 PROCEDURE — 258N000003 HC RX IP 258 OP 636: Performed by: ANESTHESIOLOGY

## 2022-02-24 PROCEDURE — 250N000009 HC RX 250: Performed by: ANESTHESIOLOGY

## 2022-02-24 PROCEDURE — S2900 ROBOTIC SURGICAL SYSTEM: HCPCS | Performed by: SURGERY

## 2022-02-24 PROCEDURE — 258N000003 HC RX IP 258 OP 636: Performed by: NURSE ANESTHETIST, CERTIFIED REGISTERED

## 2022-02-24 PROCEDURE — 250N000011 HC RX IP 250 OP 636: Performed by: NURSE ANESTHETIST, CERTIFIED REGISTERED

## 2022-02-24 PROCEDURE — 250N000009 HC RX 250: Performed by: NURSE ANESTHETIST, CERTIFIED REGISTERED

## 2022-02-24 PROCEDURE — 47562 LAPAROSCOPIC CHOLECYSTECTOMY: CPT | Mod: AS | Performed by: PHYSICIAN ASSISTANT

## 2022-02-24 PROCEDURE — 250N000013 HC RX MED GY IP 250 OP 250 PS 637: Performed by: PHYSICIAN ASSISTANT

## 2022-02-24 PROCEDURE — 88304 TISSUE EXAM BY PATHOLOGIST: CPT | Mod: TC | Performed by: SURGERY

## 2022-02-24 PROCEDURE — 250N000025 HC SEVOFLURANE, PER MIN: Performed by: SURGERY

## 2022-02-24 PROCEDURE — 360N000080 HC SURGERY LEVEL 7, PER MIN: Performed by: SURGERY

## 2022-02-24 PROCEDURE — 250N000011 HC RX IP 250 OP 636: Performed by: ANESTHESIOLOGY

## 2022-02-24 PROCEDURE — 250N000011 HC RX IP 250 OP 636: Performed by: SURGERY

## 2022-02-24 PROCEDURE — G0378 HOSPITAL OBSERVATION PER HR: HCPCS

## 2022-02-24 PROCEDURE — 250N000013 HC RX MED GY IP 250 OP 250 PS 637: Performed by: NURSE PRACTITIONER

## 2022-02-24 PROCEDURE — 47562 LAPAROSCOPIC CHOLECYSTECTOMY: CPT | Performed by: SURGERY

## 2022-02-24 PROCEDURE — 250N000011 HC RX IP 250 OP 636: Performed by: PHYSICIAN ASSISTANT

## 2022-02-24 PROCEDURE — 258N000003 HC RX IP 258 OP 636: Performed by: HOSPITALIST

## 2022-02-24 PROCEDURE — 36415 COLL VENOUS BLD VENIPUNCTURE: CPT | Performed by: HOSPITALIST

## 2022-02-24 PROCEDURE — 85027 COMPLETE CBC AUTOMATED: CPT | Performed by: HOSPITALIST

## 2022-02-24 PROCEDURE — 258N000001 HC RX 258: Performed by: SURGERY

## 2022-02-24 PROCEDURE — 250N000013 HC RX MED GY IP 250 OP 250 PS 637: Performed by: ANESTHESIOLOGY

## 2022-02-24 PROCEDURE — 370N000017 HC ANESTHESIA TECHNICAL FEE, PER MIN: Performed by: SURGERY

## 2022-02-24 PROCEDURE — 80053 COMPREHEN METABOLIC PANEL: CPT | Performed by: HOSPITALIST

## 2022-02-24 PROCEDURE — 250N000013 HC RX MED GY IP 250 OP 250 PS 637: Performed by: HOSPITALIST

## 2022-02-24 PROCEDURE — 272N000001 HC OR GENERAL SUPPLY STERILE: Performed by: SURGERY

## 2022-02-24 PROCEDURE — 250N000009 HC RX 250: Performed by: SURGERY

## 2022-02-24 PROCEDURE — 250N000011 HC RX IP 250 OP 636: Performed by: HOSPITALIST

## 2022-02-24 PROCEDURE — 99204 OFFICE O/P NEW MOD 45 MIN: CPT | Mod: 57 | Performed by: SURGERY

## 2022-02-24 PROCEDURE — 999N000141 HC STATISTIC PRE-PROCEDURE NURSING ASSESSMENT: Performed by: SURGERY

## 2022-02-24 RX ORDER — ONDANSETRON 4 MG/1
4 TABLET, ORALLY DISINTEGRATING ORAL EVERY 6 HOURS PRN
Status: DISCONTINUED | OUTPATIENT
Start: 2022-02-24 | End: 2022-02-24

## 2022-02-24 RX ORDER — MAGNESIUM HYDROXIDE 1200 MG/15ML
LIQUID ORAL PRN
Status: DISCONTINUED | OUTPATIENT
Start: 2022-02-24 | End: 2022-02-24 | Stop reason: HOSPADM

## 2022-02-24 RX ORDER — PREGABALIN 100 MG/1
200 CAPSULE ORAL EVERY EVENING
COMMUNITY

## 2022-02-24 RX ORDER — SODIUM CHLORIDE, SODIUM LACTATE, POTASSIUM CHLORIDE, CALCIUM CHLORIDE 600; 310; 30; 20 MG/100ML; MG/100ML; MG/100ML; MG/100ML
INJECTION, SOLUTION INTRAVENOUS CONTINUOUS
Status: DISCONTINUED | OUTPATIENT
Start: 2022-02-24 | End: 2022-02-24 | Stop reason: HOSPADM

## 2022-02-24 RX ORDER — GLYCOPYRROLATE 0.2 MG/ML
INJECTION, SOLUTION INTRAMUSCULAR; INTRAVENOUS PRN
Status: DISCONTINUED | OUTPATIENT
Start: 2022-02-24 | End: 2022-02-24

## 2022-02-24 RX ORDER — DEXAMETHASONE SODIUM PHOSPHATE 4 MG/ML
INJECTION, SOLUTION INTRA-ARTICULAR; INTRALESIONAL; INTRAMUSCULAR; INTRAVENOUS; SOFT TISSUE PRN
Status: DISCONTINUED | OUTPATIENT
Start: 2022-02-24 | End: 2022-02-24

## 2022-02-24 RX ORDER — KETOROLAC TROMETHAMINE 15 MG/ML
15 INJECTION, SOLUTION INTRAMUSCULAR; INTRAVENOUS EVERY 6 HOURS PRN
Status: DISCONTINUED | OUTPATIENT
Start: 2022-02-24 | End: 2022-02-26 | Stop reason: HOSPADM

## 2022-02-24 RX ORDER — PROPOFOL 10 MG/ML
INJECTION, EMULSION INTRAVENOUS CONTINUOUS PRN
Status: DISCONTINUED | OUTPATIENT
Start: 2022-02-24 | End: 2022-02-24

## 2022-02-24 RX ORDER — LISINOPRIL 5 MG/1
5 TABLET ORAL DAILY
Status: DISCONTINUED | OUTPATIENT
Start: 2022-02-25 | End: 2022-02-26 | Stop reason: HOSPADM

## 2022-02-24 RX ORDER — HYDROMORPHONE HYDROCHLORIDE 1 MG/ML
.5-1 INJECTION, SOLUTION INTRAMUSCULAR; INTRAVENOUS; SUBCUTANEOUS
Status: DISCONTINUED | OUTPATIENT
Start: 2022-02-24 | End: 2022-02-26 | Stop reason: HOSPADM

## 2022-02-24 RX ORDER — ONDANSETRON 2 MG/ML
4 INJECTION INTRAMUSCULAR; INTRAVENOUS EVERY 30 MIN PRN
Status: DISCONTINUED | OUTPATIENT
Start: 2022-02-24 | End: 2022-02-24 | Stop reason: HOSPADM

## 2022-02-24 RX ORDER — LIDOCAINE HYDROCHLORIDE 20 MG/ML
INJECTION, SOLUTION INFILTRATION; PERINEURAL PRN
Status: DISCONTINUED | OUTPATIENT
Start: 2022-02-24 | End: 2022-02-24

## 2022-02-24 RX ORDER — FENTANYL CITRATE 0.05 MG/ML
50 INJECTION, SOLUTION INTRAMUSCULAR; INTRAVENOUS EVERY 5 MIN PRN
Status: DISCONTINUED | OUTPATIENT
Start: 2022-02-24 | End: 2022-02-24 | Stop reason: HOSPADM

## 2022-02-24 RX ORDER — ACETAMINOPHEN 500 MG
1000 TABLET ORAL ONCE
Status: COMPLETED | OUTPATIENT
Start: 2022-02-24 | End: 2022-02-24

## 2022-02-24 RX ORDER — ONDANSETRON 4 MG/1
4 TABLET, ORALLY DISINTEGRATING ORAL EVERY 30 MIN PRN
Status: DISCONTINUED | OUTPATIENT
Start: 2022-02-24 | End: 2022-02-24 | Stop reason: HOSPADM

## 2022-02-24 RX ORDER — PREGABALIN 100 MG/1
100 CAPSULE ORAL 2 TIMES DAILY
Status: DISCONTINUED | OUTPATIENT
Start: 2022-02-24 | End: 2022-02-24

## 2022-02-24 RX ORDER — AMLODIPINE BESYLATE 5 MG/1
5 TABLET ORAL DAILY
Status: DISCONTINUED | OUTPATIENT
Start: 2022-02-24 | End: 2022-02-26 | Stop reason: HOSPADM

## 2022-02-24 RX ORDER — ONDANSETRON 2 MG/ML
INJECTION INTRAMUSCULAR; INTRAVENOUS PRN
Status: DISCONTINUED | OUTPATIENT
Start: 2022-02-24 | End: 2022-02-24

## 2022-02-24 RX ORDER — HYDROXYZINE HYDROCHLORIDE 25 MG/1
25-50 TABLET, FILM COATED ORAL EVERY 6 HOURS PRN
Status: DISCONTINUED | OUTPATIENT
Start: 2022-02-24 | End: 2022-02-26 | Stop reason: HOSPADM

## 2022-02-24 RX ORDER — HYDROMORPHONE HCL IN WATER/PF 6 MG/30 ML
0.2 PATIENT CONTROLLED ANALGESIA SYRINGE INTRAVENOUS
Status: DISCONTINUED | OUTPATIENT
Start: 2022-02-24 | End: 2022-02-24

## 2022-02-24 RX ORDER — HYDROMORPHONE HYDROCHLORIDE 1 MG/ML
0.5 INJECTION, SOLUTION INTRAMUSCULAR; INTRAVENOUS; SUBCUTANEOUS EVERY 5 MIN PRN
Status: DISCONTINUED | OUTPATIENT
Start: 2022-02-24 | End: 2022-02-24 | Stop reason: HOSPADM

## 2022-02-24 RX ORDER — ONDANSETRON 2 MG/ML
4 INJECTION INTRAMUSCULAR; INTRAVENOUS EVERY 6 HOURS PRN
Status: DISCONTINUED | OUTPATIENT
Start: 2022-02-24 | End: 2022-02-24

## 2022-02-24 RX ORDER — BUPIVACAINE HYDROCHLORIDE 5 MG/ML
INJECTION, SOLUTION PERINEURAL PRN
Status: DISCONTINUED | OUTPATIENT
Start: 2022-02-24 | End: 2022-02-24 | Stop reason: HOSPADM

## 2022-02-24 RX ORDER — FENTANYL CITRATE 50 UG/ML
INJECTION, SOLUTION INTRAMUSCULAR; INTRAVENOUS PRN
Status: DISCONTINUED | OUTPATIENT
Start: 2022-02-24 | End: 2022-02-24

## 2022-02-24 RX ORDER — OXYCODONE HYDROCHLORIDE 5 MG/1
5-10 TABLET ORAL EVERY 4 HOURS PRN
Status: DISCONTINUED | OUTPATIENT
Start: 2022-02-24 | End: 2022-02-24

## 2022-02-24 RX ORDER — KETOROLAC TROMETHAMINE 15 MG/ML
30 INJECTION, SOLUTION INTRAMUSCULAR; INTRAVENOUS EVERY 6 HOURS PRN
Status: DISCONTINUED | OUTPATIENT
Start: 2022-02-24 | End: 2022-02-24

## 2022-02-24 RX ORDER — DEXMEDETOMIDINE HYDROCHLORIDE 4 UG/ML
INJECTION, SOLUTION INTRAVENOUS PRN
Status: DISCONTINUED | OUTPATIENT
Start: 2022-02-24 | End: 2022-02-24

## 2022-02-24 RX ORDER — SODIUM CHLORIDE 9 MG/ML
INJECTION, SOLUTION INTRAVENOUS CONTINUOUS
Status: DISCONTINUED | OUTPATIENT
Start: 2022-02-24 | End: 2022-02-24

## 2022-02-24 RX ORDER — BUPRENORPHINE AND NALOXONE 12; 3 MG/1; MG/1
1 FILM, SOLUBLE BUCCAL; SUBLINGUAL EVERY MORNING
Status: DISCONTINUED | OUTPATIENT
Start: 2022-02-25 | End: 2022-02-24

## 2022-02-24 RX ORDER — BUPRENORPHINE AND NALOXONE 8; 2 MG/1; MG/1
2 FILM, SOLUBLE BUCCAL; SUBLINGUAL
Status: DISCONTINUED | OUTPATIENT
Start: 2022-02-24 | End: 2022-02-26 | Stop reason: HOSPADM

## 2022-02-24 RX ORDER — INDOCYANINE GREEN AND WATER 25 MG
2.5 KIT INJECTION ONCE
Status: COMPLETED | OUTPATIENT
Start: 2022-02-24 | End: 2022-02-24

## 2022-02-24 RX ORDER — HYDROMORPHONE HCL IN WATER/PF 6 MG/30 ML
0.2 PATIENT CONTROLLED ANALGESIA SYRINGE INTRAVENOUS EVERY 5 MIN PRN
Status: DISCONTINUED | OUTPATIENT
Start: 2022-02-24 | End: 2022-02-24

## 2022-02-24 RX ORDER — LEVETIRACETAM 500 MG/1
1000 TABLET ORAL 2 TIMES DAILY
Status: DISCONTINUED | OUTPATIENT
Start: 2022-02-24 | End: 2022-02-24

## 2022-02-24 RX ORDER — BUPRENORPHINE AND NALOXONE 8; 2 MG/1; MG/1
2 FILM, SOLUBLE BUCCAL; SUBLINGUAL EVERY MORNING
Status: DISCONTINUED | OUTPATIENT
Start: 2022-02-25 | End: 2022-02-26 | Stop reason: HOSPADM

## 2022-02-24 RX ORDER — PROPOFOL 10 MG/ML
INJECTION, EMULSION INTRAVENOUS PRN
Status: DISCONTINUED | OUTPATIENT
Start: 2022-02-24 | End: 2022-02-24

## 2022-02-24 RX ORDER — HYDROMORPHONE HYDROCHLORIDE 2 MG/1
2-4 TABLET ORAL EVERY 4 HOURS PRN
Status: DISCONTINUED | OUTPATIENT
Start: 2022-02-24 | End: 2022-02-26 | Stop reason: HOSPADM

## 2022-02-24 RX ORDER — KETOROLAC TROMETHAMINE 15 MG/ML
15 INJECTION, SOLUTION INTRAMUSCULAR; INTRAVENOUS ONCE
Status: COMPLETED | OUTPATIENT
Start: 2022-02-24 | End: 2022-02-24

## 2022-02-24 RX ORDER — HYDROMORPHONE HYDROCHLORIDE 1 MG/ML
.3-.5 INJECTION, SOLUTION INTRAMUSCULAR; INTRAVENOUS; SUBCUTANEOUS
Status: DISCONTINUED | OUTPATIENT
Start: 2022-02-24 | End: 2022-02-24

## 2022-02-24 RX ORDER — PREGABALIN 100 MG/1
200 CAPSULE ORAL EVERY EVENING
Status: DISCONTINUED | OUTPATIENT
Start: 2022-02-24 | End: 2022-02-24

## 2022-02-24 RX ORDER — KETAMINE HCL IN NACL, ISO-OSM 100MG/10ML
20 SYRINGE (ML) INJECTION ONCE
Status: COMPLETED | OUTPATIENT
Start: 2022-02-24 | End: 2022-02-24

## 2022-02-24 RX ORDER — HYDROCODONE BITARTRATE AND ACETAMINOPHEN 5; 325 MG/1; MG/1
1 TABLET ORAL EVERY 4 HOURS PRN
Qty: 12 TABLET | Refills: 0 | Status: SHIPPED | OUTPATIENT
Start: 2022-02-24 | End: 2022-02-25

## 2022-02-24 RX ORDER — CEFAZOLIN SODIUM/WATER 2 G/20 ML
2 SYRINGE (ML) INTRAVENOUS SEE ADMIN INSTRUCTIONS
Status: DISCONTINUED | OUTPATIENT
Start: 2022-02-24 | End: 2022-02-24 | Stop reason: HOSPADM

## 2022-02-24 RX ORDER — FENTANYL CITRATE 50 UG/ML
25 INJECTION, SOLUTION INTRAMUSCULAR; INTRAVENOUS EVERY 5 MIN PRN
Status: DISCONTINUED | OUTPATIENT
Start: 2022-02-24 | End: 2022-02-24

## 2022-02-24 RX ORDER — ZOLPIDEM TARTRATE 5 MG/1
5 TABLET ORAL
Status: DISCONTINUED | OUTPATIENT
Start: 2022-02-24 | End: 2022-02-26 | Stop reason: HOSPADM

## 2022-02-24 RX ORDER — PREGABALIN 100 MG/1
100 CAPSULE ORAL EVERY MORNING
Status: DISCONTINUED | OUTPATIENT
Start: 2022-02-24 | End: 2022-02-24

## 2022-02-24 RX ORDER — NEOSTIGMINE METHYLSULFATE 1 MG/ML
VIAL (ML) INJECTION PRN
Status: DISCONTINUED | OUTPATIENT
Start: 2022-02-24 | End: 2022-02-24

## 2022-02-24 RX ORDER — CEFAZOLIN SODIUM/WATER 2 G/20 ML
2 SYRINGE (ML) INTRAVENOUS
Status: DISCONTINUED | OUTPATIENT
Start: 2022-02-24 | End: 2022-02-24 | Stop reason: HOSPADM

## 2022-02-24 RX ORDER — DULOXETIN HYDROCHLORIDE 60 MG/1
120 CAPSULE, DELAYED RELEASE ORAL DAILY
Status: DISCONTINUED | OUTPATIENT
Start: 2022-02-24 | End: 2022-02-26 | Stop reason: HOSPADM

## 2022-02-24 RX ADMIN — ACETAMINOPHEN 1000 MG: 500 TABLET ORAL at 14:16

## 2022-02-24 RX ADMIN — MIDAZOLAM 2 MG: 1 INJECTION INTRAMUSCULAR; INTRAVENOUS at 11:05

## 2022-02-24 RX ADMIN — PHENYLEPHRINE HYDROCHLORIDE 100 MCG: 10 INJECTION INTRAVENOUS at 12:34

## 2022-02-24 RX ADMIN — Medication 20 MG: at 14:18

## 2022-02-24 RX ADMIN — GLYCOPYRROLATE 0.8 MG: 0.2 INJECTION, SOLUTION INTRAMUSCULAR; INTRAVENOUS at 13:23

## 2022-02-24 RX ADMIN — DEXMEDETOMIDINE HYDROCHLORIDE 8 MCG: 200 INJECTION INTRAVENOUS at 13:22

## 2022-02-24 RX ADMIN — PHENYLEPHRINE HYDROCHLORIDE 200 MCG: 10 INJECTION INTRAVENOUS at 12:53

## 2022-02-24 RX ADMIN — DEXMEDETOMIDINE HYDROCHLORIDE 12 MCG: 200 INJECTION INTRAVENOUS at 12:12

## 2022-02-24 RX ADMIN — HYDROMORPHONE HYDROCHLORIDE 0.5 MG: 1 INJECTION, SOLUTION INTRAMUSCULAR; INTRAVENOUS; SUBCUTANEOUS at 14:32

## 2022-02-24 RX ADMIN — KETOROLAC TROMETHAMINE 15 MG: 15 INJECTION, SOLUTION INTRAMUSCULAR; INTRAVENOUS at 21:40

## 2022-02-24 RX ADMIN — FENTANYL CITRATE 50 MCG: 50 INJECTION, SOLUTION INTRAMUSCULAR; INTRAVENOUS at 11:09

## 2022-02-24 RX ADMIN — DEXMEDETOMIDINE HYDROCHLORIDE 8 MCG: 200 INJECTION INTRAVENOUS at 12:18

## 2022-02-24 RX ADMIN — SODIUM CHLORIDE: 9 INJECTION, SOLUTION INTRAVENOUS at 02:07

## 2022-02-24 RX ADMIN — HYDROMORPHONE HYDROCHLORIDE 0.5 MG: 1 INJECTION, SOLUTION INTRAMUSCULAR; INTRAVENOUS; SUBCUTANEOUS at 14:41

## 2022-02-24 RX ADMIN — HYDROMORPHONE HYDROCHLORIDE 0.2 MG: 0.2 INJECTION, SOLUTION INTRAMUSCULAR; INTRAVENOUS; SUBCUTANEOUS at 03:36

## 2022-02-24 RX ADMIN — HYDROMORPHONE HYDROCHLORIDE 4 MG: 2 TABLET ORAL at 23:04

## 2022-02-24 RX ADMIN — SODIUM CHLORIDE, POTASSIUM CHLORIDE, SODIUM LACTATE AND CALCIUM CHLORIDE: 600; 310; 30; 20 INJECTION, SOLUTION INTRAVENOUS at 09:10

## 2022-02-24 RX ADMIN — HYDROMORPHONE HYDROCHLORIDE 0.2 MG: 0.2 INJECTION, SOLUTION INTRAMUSCULAR; INTRAVENOUS; SUBCUTANEOUS at 05:49

## 2022-02-24 RX ADMIN — PROPOFOL 200 MG: 10 INJECTION, EMULSION INTRAVENOUS at 11:09

## 2022-02-24 RX ADMIN — PHENYLEPHRINE HYDROCHLORIDE 100 MCG: 10 INJECTION INTRAVENOUS at 13:17

## 2022-02-24 RX ADMIN — LEVETIRACETAM 1000 MG: 500 TABLET, FILM COATED ORAL at 02:06

## 2022-02-24 RX ADMIN — Medication 2 G: at 11:05

## 2022-02-24 RX ADMIN — BUPRENORPHINE AND NALOXONE 2 FILM: 8; 2 FILM, SOLUBLE BUCCAL; SUBLINGUAL at 17:44

## 2022-02-24 RX ADMIN — FENTANYL CITRATE 25 MCG: 50 INJECTION, SOLUTION INTRAMUSCULAR; INTRAVENOUS at 14:03

## 2022-02-24 RX ADMIN — PHENYLEPHRINE HYDROCHLORIDE 100 MCG: 10 INJECTION INTRAVENOUS at 12:37

## 2022-02-24 RX ADMIN — ROCURONIUM BROMIDE 50 MG: 50 INJECTION, SOLUTION INTRAVENOUS at 11:09

## 2022-02-24 RX ADMIN — HYDROMORPHONE HYDROCHLORIDE 0.2 MG: 0.2 INJECTION, SOLUTION INTRAMUSCULAR; INTRAVENOUS; SUBCUTANEOUS at 08:29

## 2022-02-24 RX ADMIN — ROCURONIUM BROMIDE 20 MG: 50 INJECTION, SOLUTION INTRAVENOUS at 11:42

## 2022-02-24 RX ADMIN — HYDROMORPHONE HYDROCHLORIDE 0.5 MG: 1 INJECTION, SOLUTION INTRAMUSCULAR; INTRAVENOUS; SUBCUTANEOUS at 17:44

## 2022-02-24 RX ADMIN — FENTANYL CITRATE 25 MCG: 50 INJECTION, SOLUTION INTRAMUSCULAR; INTRAVENOUS at 13:53

## 2022-02-24 RX ADMIN — SODIUM CHLORIDE, POTASSIUM CHLORIDE, SODIUM LACTATE AND CALCIUM CHLORIDE: 600; 310; 30; 20 INJECTION, SOLUTION INTRAVENOUS at 12:37

## 2022-02-24 RX ADMIN — LIDOCAINE HYDROCHLORIDE 100 MG: 20 INJECTION, SOLUTION INFILTRATION; PERINEURAL at 11:09

## 2022-02-24 RX ADMIN — ROCURONIUM BROMIDE 10 MG: 50 INJECTION, SOLUTION INTRAVENOUS at 12:31

## 2022-02-24 RX ADMIN — ZOLPIDEM TARTRATE 5 MG: 5 TABLET ORAL at 21:40

## 2022-02-24 RX ADMIN — DEXMEDETOMIDINE HYDROCHLORIDE 12 MCG: 200 INJECTION INTRAVENOUS at 12:59

## 2022-02-24 RX ADMIN — HYDROMORPHONE HYDROCHLORIDE 0.5 MG: 1 INJECTION, SOLUTION INTRAMUSCULAR; INTRAVENOUS; SUBCUTANEOUS at 20:18

## 2022-02-24 RX ADMIN — NEOSTIGMINE METHYLSULFATE 5 MG: 1 INJECTION, SOLUTION INTRAVENOUS at 13:23

## 2022-02-24 RX ADMIN — ONDANSETRON 4 MG: 2 INJECTION INTRAMUSCULAR; INTRAVENOUS at 13:20

## 2022-02-24 RX ADMIN — DEXAMETHASONE SODIUM PHOSPHATE 4 MG: 4 INJECTION, SOLUTION INTRA-ARTICULAR; INTRALESIONAL; INTRAMUSCULAR; INTRAVENOUS; SOFT TISSUE at 11:45

## 2022-02-24 RX ADMIN — HYDROMORPHONE HYDROCHLORIDE 4 MG: 2 TABLET ORAL at 18:47

## 2022-02-24 RX ADMIN — FENTANYL CITRATE 50 MCG: 50 INJECTION, SOLUTION INTRAMUSCULAR; INTRAVENOUS at 12:20

## 2022-02-24 RX ADMIN — KETOROLAC TROMETHAMINE 15 MG: 15 INJECTION, SOLUTION INTRAMUSCULAR; INTRAVENOUS at 15:07

## 2022-02-24 RX ADMIN — ROCURONIUM BROMIDE 10 MG: 50 INJECTION, SOLUTION INTRAVENOUS at 13:01

## 2022-02-24 RX ADMIN — FENTANYL CITRATE 25 MCG: 50 INJECTION, SOLUTION INTRAMUSCULAR; INTRAVENOUS at 13:58

## 2022-02-24 RX ADMIN — HYDROMORPHONE HYDROCHLORIDE 0.5 MG: 1 INJECTION, SOLUTION INTRAMUSCULAR; INTRAVENOUS; SUBCUTANEOUS at 14:57

## 2022-02-24 RX ADMIN — FENTANYL CITRATE 50 MCG: 50 INJECTION, SOLUTION INTRAMUSCULAR; INTRAVENOUS at 11:05

## 2022-02-24 RX ADMIN — INDOCYANINE GREEN AND WATER 2.5 MG: KIT at 10:55

## 2022-02-24 RX ADMIN — FENTANYL CITRATE 50 MCG: 50 INJECTION, SOLUTION INTRAMUSCULAR; INTRAVENOUS at 12:44

## 2022-02-24 RX ADMIN — PHENYLEPHRINE HYDROCHLORIDE 100 MCG: 10 INJECTION INTRAVENOUS at 12:50

## 2022-02-24 RX ADMIN — FENTANYL CITRATE 50 MCG: 50 INJECTION, SOLUTION INTRAMUSCULAR; INTRAVENOUS at 14:15

## 2022-02-24 RX ADMIN — ROCURONIUM BROMIDE 10 MG: 50 INJECTION, SOLUTION INTRAVENOUS at 12:02

## 2022-02-24 RX ADMIN — PROPOFOL 30 MCG/KG/MIN: 10 INJECTION, EMULSION INTRAVENOUS at 11:09

## 2022-02-24 RX ADMIN — FENTANYL CITRATE 25 MCG: 50 INJECTION, SOLUTION INTRAMUSCULAR; INTRAVENOUS at 14:09

## 2022-02-24 ASSESSMENT — ENCOUNTER SYMPTOMS: SEIZURES: 1

## 2022-02-24 NOTE — ANESTHESIA PREPROCEDURE EVALUATION
Anesthesia Pre-Procedure Evaluation    Patient: Bernarda Leblanc   MRN: [id number] : 1975        Procedure : Procedure(s):  CHOLECYSTECTOMY, ROBOT-ASSISTED, LAPAROSCOPIC, WITHOUT CHOLANGIOGRAM          Past Medical History:   Diagnosis Date     Asthma      Benign essential hypertension      Disorder of thyroid      Sleep apnea       Past Surgical History:   Procedure Laterality Date     GASTRIC BYPASS           GYN SURGERY      hysterectomy      No Known Allergies   Social History     Tobacco Use     Smoking status: Current Every Day Smoker     Packs/day: 0.50     Smokeless tobacco: Never Used   Substance Use Topics     Alcohol use: No      Wt Readings from Last 1 Encounters:   22 92.5 kg (204 lb)        Anesthesia Evaluation   Pt has had prior anesthetic.     No history of anesthetic complications       ROS/MED HX  ENT/Pulmonary:     (+) sleep apnea, asthma     Neurologic: Comment: Last dose of keppra 2am    (+) seizures,     Cardiovascular:     (+) hypertension-----    METS/Exercise Tolerance:     Hematologic: Comments: Leukopenia.      Musculoskeletal:  - neg musculoskeletal ROS     GI/Hepatic: Comment: Cholelithiasis with biliary colic    (+) cholecystitis/cholelithiasis,     Renal/Genitourinary: Comment: 4 cm ovarian cyst      Endo:     (+) thyroid problem,     Psychiatric/Substance Use: Comment: Opioid dependence. On Suboxone.    (+) psychiatric history depression     Infectious Disease:  - neg infectious disease ROS     Malignancy:  - neg malignancy ROS     Other:  - neg other ROS          Physical Exam    Airway  airway exam normal      Mallampati: II   TM distance: > 3 FB   Neck ROM: full   Mouth opening: > 3 cm    Respiratory Devices and Support         Dental       (+) upper dentures and lower dentures      Cardiovascular   cardiovascular exam normal          Pulmonary   pulmonary exam normal                OUTSIDE LABS:  CBC:   Lab Results   Component Value Date    WBC 3.2 (L)  02/24/2022    WBC 3.7 (L) 02/23/2022    HGB 11.2 (L) 02/24/2022    HGB 12.8 02/23/2022    HCT 33.0 (L) 02/24/2022    HCT 38.8 02/23/2022     02/24/2022     02/23/2022     BMP:   Lab Results   Component Value Date     02/24/2022     02/23/2022    POTASSIUM 3.4 02/24/2022    POTASSIUM 3.5 02/23/2022    CHLORIDE 108 02/24/2022    CHLORIDE 109 02/23/2022    CO2 29 02/24/2022    CO2 24 02/23/2022    BUN 7 02/24/2022    BUN 5 (L) 02/23/2022    CR 0.51 (L) 02/24/2022    CR 0.64 02/23/2022    GLC 77 02/24/2022     (H) 02/23/2022     COAGS: No results found for: PTT, INR, FIBR  POC: No results found for: BGM, HCG, HCGS  HEPATIC:   Lab Results   Component Value Date    ALBUMIN 2.8 (L) 02/24/2022    PROTTOTAL 5.7 (L) 02/24/2022    ALT 21 02/24/2022    AST 17 02/24/2022    ALKPHOS 66 02/24/2022    BILITOTAL 0.2 02/24/2022     OTHER:   Lab Results   Component Value Date    LATASHA 8.1 (L) 02/24/2022    LIPASE 79 02/23/2022       Anesthesia Plan    ASA Status:  3      Anesthesia Type: General.     - Airway: ETT   Induction: Intravenous.   Maintenance: Balanced.   Techniques and Equipment:     - Airway: Video-Laryngoscope         Consents    Anesthesia Plan(s) and associated risks, benefits, and realistic alternatives discussed. Questions answered and patient/representative(s) expressed understanding.    - Discussed:     - Discussed with:  Patient         Postoperative Care    Pain management: IV analgesics, Multi-modal analgesia.   PONV prophylaxis: Ondansetron (or other 5HT-3), Dexamethasone or Solumedrol     Comments:                Erik Martinez MD

## 2022-02-24 NOTE — ED TRIAGE NOTES
Pt presents with complaints of abdominal pain, back pain and has known gallstones. Pt states pain is worsening and she was seen by the surgeon and has tentative surgery 2/28 but states pain is getting too severe. Pt reports chills but no measured fevers. Pt has loss of appetite and nausea. Pt also reports she has dysuria

## 2022-02-24 NOTE — PROGRESS NOTES
"[facility]  Surgery Consultation    PCP:  Clinic, [facility] [phone]  Consultation requested by: Edwardo Villarreal MD    HPI: This is a 46-year-old female referred by the above-mentioned provider for consultation regarding cholelithiasis.  She was recently in the emergency department whereby she was diagnosed with a urinary tract infection and likely pyelonephritis.  She was having right-sided flank pain.  She had some diarrhea associated with this.  She had some fevers or chills.  She also gives a longstanding history of postprandial right upper quadrant pain with radiation to her back.  Some associated nausea with these episodes.  These seem to be triggered by certain foods.  She has had continued discomfort since her ER visit.    PMH:   has a past medical history of Asthma, Benign essential hypertension, Disorder of thyroid, and Sleep apnea.  PSH:    has a past surgical history that includes GYN surgery and gastric bypass.  Social History:   reports that she has been smoking. She has been smoking about 0.50 packs per day. She has never used smokeless tobacco. She reports that she does not drink alcohol and does not use drugs.  Family History:  family history includes Diabetes in her daughter; Hypertension in her father and mother.  Medications/Allergies: Home medications and allergies reviewed.    ROS:  The 10 point Review of Systems is negative other than noted in the HPI.    Physical Exam:  /84   Ht 1.651 m (5' 5\")   Wt 92.5 kg (204 lb)   BMI 33.95 kg/m    GENERAL: Generally appears well.  Psych: Alert and Oriented.  Normal affect  Eyes: Sclera clear  Respiratory:  Lungs clear to ausculation bilaterally with good air excursion  Cardiovascular:  Regular Rate and Rhythm with no murmurs gallops or rubs, normal peripheral pulses  GI: Abdomen Non Distended Soft Mild tenderness to palpation RUQ No hernias palpated.  Lymphatic/Hematologic/Immune:  No femoral or cervical " lymphadenopathy.  Integumentary:  No rashes  Neurological: grossly intact    Ultrasound shows Cholelithiasis No gall bladder wall thicking  No pericholecystic fluid   All new lab and imaging data was reviewed.     Impression and Plan:  Patient is a 46 year old female with cholelithiasis.    PLAN:   I discussed the pathophysiology of gallbladder disease and complications of cholecystitis and choledocholithiasis with the patient.  Plan for elective laparoscopic cholecystectomy in the near future.  I also discussed the risks associated with the procedure including, but not limited to infection, bleeding, conversion to open, bile leak, bile duct injury, retained gallstones, pneumonia, MI, and anesthesia complications with the patient.  I also discussed if a complication did occur it may require further surgical intervention during or after the procedure. The patient indicated understanding of the discussion, asked appropriate questions, and provided consent. I provided the patient an information pamphlet. I have recommended a low fat diet and instructed the patient to go to ER if they developed persistent pain, persistent nausea and vomiting, or yellowness of skin.      Thank you very much for this consult.    Conner Pereira M.D.  [facility]  [phone]    Please route or send letter to:  Primary Care Provider (PCP) and Referring Provider

## 2022-02-24 NOTE — ED PROVIDER NOTES
History     Chief Complaint:  Abdominal Pain       HPI   Bernarda Leblanc is a 46 year old female with a history of seizure, hypertension, disc herniation, opioid dependence, status post gastric bypass as well as hysterectomy, presents to the emergency department with abdominal pain and back pain.  She was seen in the emergency department on 211 for flank pain she underwent ultrasound imaging as well as CT.  Ultrasound showed gallbladder distention with several gallstones and sludge however no evidence of cholecystitis.  A CT of her abdomen showed a left ovarian cyst 0.4 cm.  Blood work was performed as well.  She was ultimately sent home with a small amount of pain medication and advised to follow-up with general surgery.  Patient saw Dr. Pereira of general surgery 2 days ago.  The plan was to remove the gallbladder on February 28.  She presents to the emergency department with persistent and worsening right upper quadrant pain that radiates to the back, chills, no fever, decreased oral intake but no vomiting or nausea as well as urinary frequency which she states has been going on for a while.she denies any sore throat, chest pain, shortness of breath, change in medications.  Of note she is COVID vaccinated and booster.    ROS:  Review of Systems   Constitutional: Negative for fever.   HENT: Negative for sore throat.    Respiratory: Negative for cough and shortness of breath.    Cardiovascular: Negative for chest pain.   Gastrointestinal: Positive for abdominal pain. Negative for vomiting.   Genitourinary: Positive for frequency. Negative for dysuria.   Musculoskeletal: Positive for back pain.   All other systems reviewed and are negative.        Allergies:  No Known Allergies     Medications:    amLODIPine (NORVASC) 5 MG tablet  Buprenorphine HCl-Naloxone HCl (SUBOXONE) 12-3 MG FILM per film  buprenorphine HCl-naloxone HCl (SUBOXONE) 8-2 MG per film  DULoxetine (CYMBALTA) 60 MG capsule  levETIRAcetam  "(KEPPRA) 500 MG tablet  lisinopril (PRINIVIL/ZESTRIL) 5 MG tablet  minoxidil (LONITEN) 2.5 MG tablet  multivitamin, therapeutic (THERA-VIT) TABS tablet  pregabalin (LYRICA) 100 MG capsule  senna (SENOKOT) 8.6 MG tablet  SUMAtriptan (IMITREX) 100 MG tablet  zolpidem ER (AMBIEN CR) 12.5 MG CR tablet        Past Medical History:    Past Medical History:   Diagnosis Date     Asthma      Benign essential hypertension      Disorder of thyroid      Sleep apnea      Patient Active Problem List   Diagnosis     Biliary colic     Gallstones        Past Surgical History:    Past Surgical History:   Procedure Laterality Date     GASTRIC BYPASS      1998     GYN SURGERY      hysterectomy        Family History:    family history includes Diabetes in her daughter; Hypertension in her father and mother.    Social History:   reports that she has been smoking. She has been smoking about 0.50 packs per day. She has never used smokeless tobacco. She reports that she does not drink alcohol and does not use drugs.  PCP: Clinic, [facility]     Physical Exam     Patient Vitals for the past 24 hrs:   BP Temp Temp src Pulse Resp SpO2 Height Weight   02/23/22 2245 -- -- -- -- -- 97 % -- --   02/23/22 2230 -- -- -- -- -- 95 % -- --   02/23/22 2215 -- -- -- -- -- 97 % -- --   02/23/22 2200 -- -- -- -- -- 97 % -- --   02/23/22 2145 -- -- -- -- -- 96 % -- --   02/23/22 2130 -- -- -- 85 -- 97 % -- --   02/23/22 2030 120/72 -- -- 97 -- 94 % -- --   02/23/22 2015 99/76 -- -- -- -- -- -- --   02/23/22 1930 -- -- -- -- -- 93 % -- --   02/23/22 1900 -- -- -- -- -- 93 % -- --   02/23/22 1816 135/71 98.5  F (36.9  C) Oral 120 20 98 % 1.651 m (5' 5\") 92.5 kg (204 lb)        Physical Exam    Physical Exam   Constitutional:  Patient is oriented to person, place, and time. They appear well-developed and well-nourished. Mild distress secondary to abdominal pain   HENT:   Eyes:    Conjunctivae normal and EOM are normal. Pupils are equal, round, and " reactive to light.   Neck:    Normal range of motion.   Cardiovascular: Normal rate, regular rhythm and normal heart sounds.  Exam reveals no gallop and no friction rub.  No murmur heard.  Pulmonary/Chest:  Effort normal and breath sounds normal. Patient has no wheezes. Patient has no rales.   Abdominal:   Soft.  Reproducible pain greatest in the right upper quadrant.  No guarding or rebound.   Musculoskeletal:  Normal range of motion. Patient exhibits no edema.   Neurological:   Patient is alert and oriented to person, place, and time. Patient has normal strength. No cranial nerve deficit or sensory deficit. GCS 15  Skin:   Skin is warm and dry. No rash noted. No erythema.   Psychiatric:   Patient has a normal mood      Emergency Department Course       Imaging:  US Abdomen Limited (RUQ)   Final Result   IMPRESSION:   1.  Cholelithiasis and sludge without evidence for acute cholecystitis.               Report per radiology    Laboratory:  Labs Ordered and Resulted from Time of ED Arrival to Time of ED Departure   COMPREHENSIVE METABOLIC PANEL - Abnormal       Result Value    Sodium 140      Potassium 3.5      Chloride 109      Carbon Dioxide (CO2) 24      Anion Gap 7      Urea Nitrogen 5 (*)     Creatinine 0.64      Calcium 8.7      Glucose 69 (*)     Alkaline Phosphatase 88      AST 22      ALT 26      Protein Total 6.7 (*)     Albumin 3.4      Bilirubin Total 0.1 (*)     GFR Estimate >90     CBC WITH PLATELETS AND DIFFERENTIAL - Abnormal    WBC Count 3.7 (*)     RBC Count 4.09      Hemoglobin 12.8      Hematocrit 38.8      MCV 95      MCH 31.3      MCHC 33.0      RDW 13.7      Platelet Count 318      % Neutrophils 30      % Lymphocytes 58      % Monocytes 8      % Eosinophils 3      % Basophils 0      % Immature Granulocytes 1      NRBCs per 100 WBC 0      Absolute Neutrophils 1.1 (*)     Absolute Lymphocytes 2.2      Absolute Monocytes 0.3      Absolute Eosinophils 0.1      Absolute Basophils 0.0      Absolute  Immature Granulocytes 0.0      Absolute NRBCs 0.0     GLUCOSE BY METER - Abnormal    GLUCOSE BY METER POCT 106 (*)    LIPASE - Normal    Lipase 79     ROUTINE UA WITH MICROSCOPIC REFLEX TO CULTURE - Normal    Color Urine Straw      Appearance Urine Clear      Glucose Urine Negative      Bilirubin Urine Negative      Ketones Urine Negative      Specific Gravity Urine 1.006      Blood Urine Negative      pH Urine 5.5      Protein Albumin Urine Negative      Urobilinogen Urine Normal      Nitrite Urine Negative      Leukocyte Esterase Urine Negative      RBC Urine <1      WBC Urine 0     COVID-19 VIRUS (CORONAVIRUS) BY PCR - Normal    SARS CoV2 PCR Negative     GLUCOSE MONITOR NURSING POCT        Procedures   None    Emergency Department Course:         Reviewed:  I reviewed nursing notes, vitals and past medical history    Assessments:  1840 I obtained history and examined the patient as noted above.     I rechecked the patient and explained findings.        Consults:   Dr. Masterson hospitallist    Interventions:  Medications   0.9% sodium chloride BOLUS (0 mLs Intravenous Stopped 2/23/22 2003)   HYDROmorphone (PF) (DILAUDID) injection 0.5 mg (0.5 mg Intravenous Given 2/23/22 1854)   ondansetron (ZOFRAN) injection 4 mg (4 mg Intravenous Given 2/23/22 1854)   ketorolac (TORADOL) injection 15 mg (15 mg Intravenous Given 2/23/22 2015)        Disposition:  The patient was admitted to the hospital under the care of Dr. Masterson.     Impression & Plan    CMS Diagnoses: None      Covid-19  Bernarda STEVE Leblanc was evaluated during a global COVID-19 pandemic, which necessitated consideration that the patient might be at risk for infection with the SARS-CoV-2 virus that causes COVID-19.   Applicable protocols for evaluation were followed during the patient's care.   COVID-19 was considered as part of the patient's evaluation. The plan for testing is:  a test was obtained during this visit.    Medical Decision Making:  Bernarda  Benji is a 46-year-old female with known gallstones coming in with persistent right upper quadrant abdominal pain that radiates to the back.  Her physical exam found reproducible pain on the right upper abdomen without peritoneal signs.  Blood work was performed.  Her CBC shows a stable mild leukopenia.  She is status post hysterectomy.  Her metabolic panel does not show any evidence of elevated liver function, bilirubin, or alkaline phosphatase.  Her lipase is also normal.  I did repeat her ultrasound as she states her pain is progressively worsened from when she was here 12 days ago when she had an ultrasound that showed cholelithiasis with sludge.  Ultrasound today looks fairly unchanged.  I did discuss with her observation for pain control and having the cholecystectomy versus going home and following up with Dr. Pereira.  Given her pain has been persistent and increased we will bring her in for her biliary colic.  Admit to Dr. Masterson..        Diagnosis:    ICD-10-CM    1. Gallstones  K80.20    2. Biliary colic  K80.50         2/23/2022   Shalini Roger MD Bochert, Michelle Ann, MD  02/23/22 5724

## 2022-02-24 NOTE — ED NOTES
[facility]  ED Nurse Handoff Report    ED Chief complaint: Abdominal Pain      ED Diagnosis:   Final diagnoses:   Gallstones   Biliary colic       Code Status: MD to address    Allergies: No Known Allergies    Patient Story: Pt arrived to ED with worsening back and abd pain due to known gallstones. Pt endorsed dysuria, chills. Pt denies fever. BG 69, resolved to  with orange juice given. Tentative surgery scheduled for 2/28.    Focused Assessment: Abd and back pain rated 10-11/10.     Treatments and/or interventions provided: Ultrasounds, Toradol given x1, 1L NS bolus, Zofran, IV Dilaudid given x2    Patient's response to treatments and/or interventions: Mild pain relief    To be done/followed up on inpatient unit:      Does this patient have any cognitive concerns?: A/Ox4    Activity level - Baseline/Home:  Independent  Activity Level - Current:   Independent    Patient's Preferred language: English   Needed?: No    Isolation: None  Infection: Not Applicable  Patient tested for COVID 19 prior to admission: YES  Bariatric?: No    Vital Signs:   Vitals:    02/23/22 2200 02/23/22 2215 02/23/22 2230 02/23/22 2245   BP:       Pulse:       Resp:       Temp:       TempSrc:       SpO2: 97% 97% 95% 97%   Weight:       Height:           Cardiac Rhythm:     Was the PSS-3 completed:   Yes  What interventions are required if any?               Family Comments:   OBS brochure/video discussed/provided to patient/family: No              Name of person given brochure if not patient:               Relationship to patient:     For the majority of the shift this patient's behavior was Green.   Behavioral interventions performed were .    ED NURSE PHONE NUMBER: [phone]

## 2022-02-24 NOTE — ANESTHESIA PROCEDURE NOTES
Airway       Patient location during procedure: OR       Procedure Start/Stop Times: 2/24/2022 11:11 AM  Staff -        Anesthesiologist:  Pete Wilcox MD       CRNA: Rohini Lopez APRN CRNA       Performed By: CRNA  Consent for Airway        Urgency: elective  Indications and Patient Condition       Indications for airway management: jose-procedural         Mask difficulty assessment: 2 - vent by mask + OA or adjuvant +/- NMBA    Final Airway Details       Final airway type: endotracheal airway       Successful airway: ETT - single  Endotracheal Airway Details        ETT size (mm): 7.0       Cuffed: yes       Successful intubation technique: direct laryngoscopy       DL Blade Type: MAC 3       Grade View of Cords: 1       Adjucts: stylet       Position: Right       Measured from: gums/teeth       Secured at (cm): 22       Bite block used: None    Post intubation assessment        Placement verified by: capnometry, equal breath sounds and chest rise        Number of attempts at approach: 1       Secured with: pink tape       Ease of procedure: easy       Dentition: Intact and Unchanged

## 2022-02-24 NOTE — CONSULTS
"[facility] General Surgery Consultation    Bernarda Leblanc MRN# [id number]   YOB: 1975 Age: 46 year old      Date of Admission:  2/23/2022  Date of Consult: 2/24/2022         Assessment and Plan:   Patient is a 46 year old female with symptomatic cholelithiasis    PLAN:  -Medical management per primary team  -N.p.o. and IV fluids  -Pain control and antiemetics as needed  -Preoperative IV antibiotics  -We will plan to proceed to the operating room this a.m. for robotic cholecystectomy.  The risks and benefits of the procedure were discussed with the patient, who is in agreement with proceeding.         Requesting Physician:              Chief Complaint:     Chief Complaint   Patient presents with     Abdominal Pain          History of Present Illness:   Bernarda Leblanc is a 46 year old female who was seen in consultation at the request of  who presented with abdominal pain.  Patient initially presented to the emergency department on 2/11/2022 with flank pain.  Right upper quadrant ultrasound demonstrated gallbladder distention and evidence of cholelithiasis.  She was discharged home and followed up in the general surgery clinic.  Elective cholecystectomy was planned for 2/28/2022.  Yesterday, the patient developed increased right upper quadrant pain with radiation into her back.  She reports having chills but no fevers.  No nausea or vomiting.  No significant family history of gallbladder disease.  The patient does report that, in retrospect, she has been having intermittent back and right shoulder pain for greater than a year.  Past abdominal surgical history is significant for tubal ligation, hysterectomy, and laparoscopic gastric bypass completed in 1998 at [facility].          Physical Exam:   Blood pressure 127/72, pulse 84, temperature 97.9  F (36.6  C), temperature source Oral, resp. rate 18, height 1.651 m (5' 5\"), weight 92.5 kg (204 lb), " SpO2 95 %.  204 lbs 0 oz  General: Vital signs reviewed, in no apparent distress  Eyes: Anicteric  HENT: Normocephalic, atraumatic, trachea midline   Respiratory: Breathing nonlabored  Cardiovascular: Regular rate and rhythm  GI: Abdomen soft, nondistended, focally tender with palpation in the right upper quadrant  Musculoskeletal: No gross deformities  Neurologic: Grossly nonfocal exam  Psychiatric: Normal mood, affect and insight  Integumentary: Warm and dry         Past Medical History:     Past Medical History:   Diagnosis Date     Asthma      Benign essential hypertension      Disorder of thyroid      Sleep apnea             Past Surgical History:     Past Surgical History:   Procedure Laterality Date     GASTRIC BYPASS      1998     GYN SURGERY      hysterectomy            Current Medications:           levETIRAcetam  1,000 mg Oral BID       HYDROmorphone, ketorolac, melatonin, ondansetron **OR** ondansetron         Home Medications:     Prior to Admission medications    Medication Sig Last Dose Taking? Auth Provider   amLODIPine (NORVASC) 5 MG tablet Take 5 mg by mouth daily 2/23/2022 at Unknown time Yes Unknown, Entered By History   Buprenorphine HCl-Naloxone HCl (SUBOXONE) 12-3 MG FILM per film Place 1 Film under the tongue 2 times daily Past Week at Unknown time Yes Unknown, Entered By History   buprenorphine HCl-naloxone HCl (SUBOXONE) 8-2 MG per film Place 1 Film under the tongue daily 2/23/2022 at Unknown time Yes Unknown, Entered By History   DULoxetine (CYMBALTA) 60 MG capsule Take 120 mg by mouth daily 2/23/2022 at Unknown time Yes Reported, Patient   levETIRAcetam (KEPPRA) 500 MG tablet Take 1,000 mg by mouth 2 times daily 2/23/2022 at am Yes Reported, Patient   lisinopril (PRINIVIL/ZESTRIL) 5 MG tablet Take 5 mg by mouth daily 2/23/2022 at Unknown time Yes Reported, Patient   minoxidil (LONITEN) 2.5 MG tablet Take 5 mg by mouth daily 2/23/2022 at Unknown time Yes Unknown, Entered By History    multivitamin, therapeutic (THERA-VIT) TABS tablet Take 1 tablet by mouth daily 2/23/2022 at Unknown time Yes Unknown, Entered By History   pregabalin (LYRICA) 100 MG capsule 1 in the morning, and 2 tabs in the evening. 2/22/2022 at Unknown time Yes Unknown, Entered By History   senna (SENOKOT) 8.6 MG tablet Take 2 tablets by mouth daily 2/23/2022 at Unknown time Yes Unknown, Entered By History   SUMAtriptan (IMITREX) 100 MG tablet Take 100 mg by mouth at onset of headache for migraine 2/22/2022 at Unknown time Yes Unknown, Entered By History   zolpidem ER (AMBIEN CR) 12.5 MG CR tablet Take 12.5 mg by mouth nightly as needed for sleep 2/22/2022 at Unknown time Yes Unknown, Entered By History            Allergies:   No Known Allergies         Family History:     Family History   Problem Relation Age of Onset     Hypertension Mother      Hypertension Father      Diabetes Daughter            Social History:   Bernarda Leblanc  reports that she has been smoking. She has been smoking about 0.50 packs per day. She has never used smokeless tobacco. She reports that she does not drink alcohol and does not use drugs.          Review of Systems:   Constitutional:  chills  Eyes: No blurred or double vision  HENT: Denies headaches, No rhinorrhea, No sore throat  Respiratory: No cough or shortness of breath  Cardiovascular: Denies chest pain or palpitations  Gastrointestinal: Abdominal pain  Genitourinary: No hematuria or dysuria  Musculoskeletal: Back pain  Neurologic: No numbness or tingling  Integumentary: No skin rashes         Labs/Imaging   All new lab and imaging data was reviewed.   Recent Labs   Lab 02/24/22  0714 02/23/22  1824   WBC 3.2* 3.7*   HGB 11.2* 12.8   HCT 33.0* 38.8   MCV 93 95    318     Recent Labs   Lab 02/23/22 1958 02/23/22  1824   NA  --  140   POTASSIUM  --  3.5   CHLORIDE  --  109   CO2  --  24   ANIONGAP  --  7   * 69*   BUN  --  5*   CR  --  0.64   GFRESTIMATED  --  >90   LATASHA   --  8.7   PROTTOTAL  --  6.7*   ALBUMIN  --  3.4   BILITOTAL  --  0.1*   ALKPHOS  --  88   AST  --  22   ALT  --  26     Recent Labs   Lab 02/23/22  1824   LIPASE 79       I have personally reviewed the imaging studies-   US ABDOMEN LIMITED  LOCATION: [facility]  DATE/TIME: 2/23/2022 8:05 PM     FINDINGS:     GALLBLADDER: Cholelithiasis and sludge with normal wall thickness. Negative sonographic Santiago's.     BILE DUCTS: No biliary dilatation. The common duct measures 5 mm.     LIVER: Normal parenchyma with smooth contour. No focal mass.     RIGHT KIDNEY: No hydronephrosis.     PANCREAS: The visualized portions are normal.     No ascites.                                                                      IMPRESSION:  1.  Cholelithiasis and sludge without evidence for acute cholecystitis.      Lisa Olivas MD

## 2022-02-24 NOTE — OR NURSING
Dr Manzano called to bedside re: uncontrollable pain. Orders to continue with fentanyl, new orders for tylenol and ketamine. See MAR.

## 2022-02-24 NOTE — PHARMACY-ADMISSION MEDICATION HISTORY
Addm: I spoke with patient 2/24 AM to further clarify her suboxone doses and she takes 1 12mg film in the morning and 2 x 8mg films after work in the evening. She sometimes misses doses. (Previously listed as 8mg daily and 12mg BID) Toshia Torre, PharmD       Pharmacy Medication History  Admission medication history interview status for the 2/23/2022  admission is complete. See EPIC admission navigator for prior to admission medications     Location of Interview: Patient room  Medication history sources: Patient and Surescripts    Significant changes made to the medication list:  -All meds added to PTA list on this admission.  -Last fills on both suboxone per surescript and  were on 12/28/21. She said she does not always take them as prescribed and misses some doses.    In the past week, patient estimated taking medication this percent of the time: 50-90%    Additional medication history information:       Medication reconciliation completed by provider prior to medication history? No    Time spent in this activity: 20 min    Prior to Admission medications    Medication Sig Last Dose Taking? Auth Provider   amLODIPine (NORVASC) 5 MG tablet Take 5 mg by mouth daily 2/23/2022 at Unknown time Yes Unknown, Entered By History   Buprenorphine HCl-Naloxone HCl (SUBOXONE) 12-3 MG FILM per film Place 1 Film under the tongue every morning  Past Week at Unknown time Yes Unknown, Entered By History   buprenorphine HCl-naloxone HCl (SUBOXONE) 8-2 MG per film Place 2 Film under the tongue every evening 5 or 6pm after work 2/23/2022 at Unknown time Yes Unknown, Entered By History   DULoxetine (CYMBALTA) 60 MG capsule Take 120 mg by mouth daily 2/23/2022 at Unknown time Yes Reported, Patient   levETIRAcetam (KEPPRA) 500 MG tablet Take 1,000 mg by mouth 2 times daily 2/23/2022 at am Yes Reported, Patient   lisinopril (PRINIVIL/ZESTRIL) 5 MG tablet Take 5 mg by mouth daily 2/23/2022 at Unknown time Yes Reported, Patient    minoxidil (LONITEN) 2.5 MG tablet Take 5 mg by mouth daily 2/23/2022 at Unknown time Yes Unknown, Entered By History   multivitamin, therapeutic (THERA-VIT) TABS tablet Take 1 tablet by mouth daily 2/23/2022 at Unknown time Yes Unknown, Entered By History   pregabalin (LYRICA) 100 MG capsule Take 200 mg by mouth every evening 2/23/2022 at Unknown time Yes Unknown, Entered By History   pregabalin (LYRICA) 100 MG capsule Take 100 mg by mouth every morning  2/22/2022 at Unknown time Yes Unknown, Entered By History   senna (SENOKOT) 8.6 MG tablet Take 2 tablets by mouth daily 2/23/2022 at Unknown time Yes Unknown, Entered By History   SUMAtriptan (IMITREX) 100 MG tablet Take 100 mg by mouth at onset of headache for migraine 2/22/2022 at Unknown time Yes Unknown, Entered By History   zolpidem ER (AMBIEN CR) 12.5 MG CR tablet Take 12.5 mg by mouth nightly as needed for sleep 2/22/2022 at Unknown time Yes Unknown, Entered By History       The information provided in this note is only as accurate as the sources available at the time of update(s)

## 2022-02-24 NOTE — PROVIDER NOTIFICATION
Pt does not have any post-op pain medication.  Called on call , see new Dr's orders.  Will continue to monitor.

## 2022-02-24 NOTE — ANESTHESIA CARE TRANSFER NOTE
Patient: Bernarda Leblanc    Procedure: Procedure(s):  CHOLECYSTECTOMY, ROBOT-ASSISTED, LAPAROSCOPIC, WITHOUT CHOLANGIOGRAM       Diagnosis: Symptomatic cholelithiasis [K80.20]  Diagnosis Additional Information: No value filed.    Anesthesia Type:   General     Note:    Oropharynx: oropharynx clear of all foreign objects and spontaneously breathing  Level of Consciousness: awake  Oxygen Supplementation: face mask  Level of Supplemental Oxygen (L/min / FiO2): 4  Independent Airway: airway patency satisfactory and stable  Dentition: dentition unchanged  Vital Signs Stable: post-procedure vital signs reviewed and stable  Report to RN Given: handoff report given  Patient transferred to: PACU  Comments: Neuromuscular blockade reversed after TOF 4/4, spontaneous respirations, adequate tidal volumes, followed commands to voice, oropharynx suctioned with soft flexible catheter, extubated atraumatically, extubated with suction, airway patent after extubation.  Oxygen via facemask at 4 liters per minute to PACU. Oxygen tubing connected to wall O2 in PACU, SpO2, NiBP, and EKG monitors and alarms on and functioning, report on patient's clinical status given to PACU RN, RN questions answered.     Handoff Report: Identifed the Patient, Identified the Reponsible Provider, Reviewed the pertinent medical history, Discussed the surgical course, Reviewed Intra-OP anesthesia mangement and issues during anesthesia, Set expectations for post-procedure period and Allowed opportunity for questions and acknowledgement of understanding      Vitals:  Vitals Value Taken Time   /92 02/24/22 1346   Temp     Pulse 75 02/24/22 1350   Resp 15 02/24/22 1350   SpO2 100 % 02/24/22 1350   Vitals shown include unvalidated device data.    Electronically Signed By: BRENDAN Owen CRNA  February 24, 2022  1:51 PM

## 2022-02-24 NOTE — DISCHARGE INSTRUCTIONS
[facility] - [facility] CONSULT[facility]S  Discharge Instructions: Post-Operative Laparoscopic Cholecystectomy    ACTIVITY    Expect to feel tired after your surgery.  This will gradually resolve.      Take frequent, short walks and increase your activity gradually.      Avoid strenuous physical activity or heavy lifting greater than 15-20 lbs. for 2-3 weeks.  You may climb stairs.    You may drive without restrictions when you are not using any prescription pain medication and feel comfortable in a car.    You may return to work/school when you are comfortable without any prescription pain medication.    WOUND CARE    You may remove your outer dressing or Band-Aids and shower 48 hours after the surgery.  Pat your incisions dry and leave them open to air.  Re-apply dressing (Band-Aids or gauze/tape) as needed for comfort or drainage.    You have surgical glue on your incisions. Let this peel up and fall off on its own.    Do not soak your incisions in a tub or pool for 2 weeks.     Do not apply any lotions, creams, or ointments to your incisions.    A ridge under your incisions is normal and will gradually resolve.    DIET    Start with liquids, then gradually resume your regular diet as tolerated.  Avoid heavy, spicy, and greasy meals for 2-3 days.    Drink plenty of fluids to stay hydrated.    It is not uncommon to experience some loose stools or diarrhea after surgery.  This is your body's way of adapting to the bile which will slowly drain into your intestine.  A low fat diet may help with this.  This should improve over 1-2 months.    PAIN    Expect some tenderness and discomfort at the incision sites.  Use the prescribed pain medication at your discretion.  Expect gradual resolution of your pain over several days.    Do not take Suboxone while taking Norco.  Follow up with your pain clinic/primary doctor if you need a refill of [place] prescription.    You may take ibuprofen with food (unless you have been told  not to) or acetaminophen/Tylenol instead of or in addition to your prescribed pain medication.  If you are taking Norco, do not take any additional acetaminophen/Tylenol.    Do not drink alcohol or drive while you are taking pain medications.    You may apply ice to your incisions in 20 minute intervals as needed for the next 48 hours.  After that time, consider switching to heat if you prefer.    EXPECTATIONS    Pain medications can cause constipation.  Limit use when possible.  Take over the counter stool softener/stimulant, such as Colace or Senna, 1-2 times a day with plenty of water.  You may take a mild over the counter laxative, such as Miralax or a suppository, as needed.  You may discontinue these medications once you are having regular bowel movements and/or are no longer taking your narcotic pain medication.      You may have shoulder or upper back discomfort due to the gas used in surgery.  This is temporary and should resolve in 48-72 hours.  Short, frequent walks may help with this.    If you are unable to urinate, or feel as though you are not emptying your bladder adequately, we recommend you call our office and/or seek care at an ER or Urgent Care facility if after hours.    FOLLOW UP    Our office will contact you in approximately 2 weeks to check on your progress and answer any questions you may have.  If you are doing well, you will not need to return for a follow up appointment.  If any concerns are identified over the phone, we will help you make an appointment to see a provider.     If you have not received a phone call, have any questions or concerns, or would like to be seen, please call us at [phone] and ask to speak with our nurse.  We are located at [address] [place] [address].    CALL OUR OFFICE -[phone] IF YOU HAVE:     Chills or fever above 101 F.    Increased redness, warmth, or drainage at your incisions.    Significant bleeding.    Pain not  relieved by your pain medication or rest.    Increasing pain after the first 48 hours.    Any other concerns or questions.                          **If you have concerns or questions about your procedure,    please contact Dr. Olivas at  [phone]**

## 2022-02-24 NOTE — OP NOTE
Procedure Date: 02/24/22     STAFF SURGEON:  Lisa Olivas MD      ASSISTANT:  RICH Mcgee     PREOPERATIVE DIAGNOSIS:  Symptomatic cholelithiasis.      POSTOPERATIVE DIAGNOSIS:  Symptomatic cholelithiasis, chronic cholecystitis.      PROCEDURES PERFORMED:  Robotic cholecystectomy.      INDICATIONS:  Bernarda is a 46-year-old female who presented with abdominal pain.  The patient was evaluated and right upper quadrant ultrasound demonstrated evidence of cholelithiasis.  LFT's were within normal limits.  Due to the persistence of the patient's symptoms, the decision was made for the patient to proceed to the operating room for robotic cholecystectomy.  The risks and benefits of the procedure were discussed with the patient and consent for the procedure was obtained.      ANESTHESIA:  General.      DESCRIPTION OF PROCEDURE:  The patient was brought to the preoperative area and preoperative antibiotics were administered.  The patient also received preoperative indocyanine green injection.  The patient was then taken back to the operating room and placed in the supine position on the operating table.  A timeout was completed.  Anesthesia was induced and the patient was intubated.  The patient was secured to the operating table and her pressure points were padded.  The patient's abdomen was prepped and draped in a sterile fashion.        Following infiltration with local anesthetic, the 11 blade scalpel was used to make a small left upper quadrant incision.  Entrance into the abdomen was then gained under direct visualization using the 5 mm Visiport and the 5 mm laparoscope.  When the patient's abdomen had been safely entered, it was fully insufflated.  The laparoscope was then reinserted into the abdominal cavity and initial inspection revealed no evidence of injury at the port entry site.  Under direct visualization, 3 additional 8 mm robotic ports were placed extending in a line towards the patient's right mid  abdomen.  The patient's left upper quadrant 5 mm port was then upsized to an 8 mm robotic port.  The patient was then placed into the reverse Trendelenburg position with a slight left tilt.  The robot was then brought onto the field and docked.  The robotic tip-up grasper, robotic fenestrated bipolar grasper, and the robotic scissors were introduced into the abdominal cavity under direct visualization.  There were significant omental adhesions to the gallbladder.  A significant amount of time was spent taking these adhesions down utilizing the robotic scissors and electrocautery. The tip-up grasper was used to grasp the dome of the gallbladder, which was then reflected cephalad.  The fenestrated grasper was used to grasp the infundibulum of the gallbladder.  The robotic scissors was then used to carefully dissect out the patient's cystic duct and cystic artery.  Firefly visualization was utilized throughout the dissection to confirm our anatomy.  When the patient's cystic duct and cystic artery had been completely dissected out and the critical view had been obtained, the cystic duct was doubly clipped proximally, singly clipped distally and divided.  The cystic artery was clipped proximally and then divided utilizing the electrocautery on the robotic scissors.  The gallbladder was then carefully  free from its attachments to the liver bed utilizing the robotic scissors and electrocautery.  When the gallbladder had been completely dissected free, it was placed into an Endocatch bag and removed through the patient's left upper quadrant port site.  The patient's right upper quadrant was again surveyed.  The cystic duct and cystic artery clips were found to be intact.  There was no significant bleeding from the liver bed.  Hemostasis was ensured.  The right upper quadrant was irrigated with normal saline solution.        We then turned our attention to the patient's left upper quadrant port site.  The port was  removed and the fascia reapproximated using the Uri-Sg fascial closure device.  The Uri-Sg fascial closure device was then utilized to place an 0 Vicryl suture to reapproximate the fascia at the left upper quadrant port site.  The remaining robotic equipment was removed from the abdominal cavity and the robot was undocked.  The patient's abdomen was allowed to desufflate fully.  The robotic ports were removed and the port sites inspected and hemostasis was ensured.  The port sites were then reapproximated using 4-0 Monocryl subcuticular stitches.  The incisions were washed and dried and skin glue was applied.  Lap, needle and instrument counts were correct at the end of the procedure.  The patient tolerated the procedure well and was taken to the recovery area in stable condition.     Vic Mancilla PA-C assisted in the above procedure. They provided assistance with pre-operative positioning, prepping, and draping of the patient. The assistant provided vital operative assistance with retraction using instruments thus providing the necessary exposure and visualization for the case, manipulation of tissues to achieve hemostasis, suction for visualization and assisted in closure of the wound. Post-operatively they assisted in transfer of the patient off the operative table and transition to the PACU physicians.       ESTIMATED BLOOD LOSS:  15 mL      FINDINGS:   Gallbladder with significant omental adhesions, consistent with chronic cholecystitis     COMPLICATIONS:  None.      SPECIMENS:  Gallbladder and contents.      DRAINS:  None.      CONDITION:  The patient will be readmitted to the surgical care unit with instructions for postoperative cares and medications for pain management.         EUGENE CROOK MD

## 2022-02-24 NOTE — PROGRESS NOTES
A&O x4. VSS on RA. Independent with transfer.CMS intact. Voiding in BR. PIV infusing.Pain on right mid abdomen radiating on back managed with IV Dilaudid.On  NPO except meds, instructed. For possible surgery today.

## 2022-02-24 NOTE — BRIEF OP NOTE
[facility]    Brief Operative Note    Pre-operative diagnosis: Symptomatic cholelithiasis [K80.20]  Post-operative diagnosis Symptomatic Cholelithiasis    Procedure: Procedure(s):  CHOLECYSTECTOMY, ROBOT-ASSISTED, LAPAROSCOPIC, WITHOUT CHOLANGIOGRAM     Surgeon: Surgeon(s) and Role:     * Lisa Olivas MD - Primary     * Vic Mancilla PA-C - Assisting     Anesthesia: General   Estimated Blood Loss: 10 mL from 2/24/2022 11:03 AM to 2/24/2022  1:44 PM      Drains: None  Specimens:   ID Type Source Tests Collected by Time Destination   1 : GALLBLADDER Tissue Gallbladder SURGICAL PATHOLOGY EXAM Lisa Olivas MD 2/24/2022  1:13 PM      Findings:   None.  Complications: None.  Implants: * No implants in log *

## 2022-02-24 NOTE — PROGRESS NOTES
[facility]    Medicine History and Physical - Hospitalist Service       Date of Admission:  2/23/2022    Assessment & Plan   Bernarda Leblanc is a 46 year old female admitted on 2/23/2022. PMHx including obesity, depression, seizure disorder, HTN, prior gastric bypass surgery, and recently diagnosed cholelithiasis who presented with persistent biliary colic.     Symptomatic cholelithiasis s/p robotic cholecystectomy (2/24/2022).  Initially presented 2/11; ultrasound abdomen and CT abd / pelvis consistent with cholelithiasis. Subsequently scheduled elective cholecystectomy on Monday 2/28.    This adm, LFTs and lipase normal and repeat ultrasound showed cholelithiasis and sludge without any evidence of cholecystitis. No fever, leukocytosis.  - Appreciate general surgery consult.   - S/p robotic cholecystectomy (2/24/2022). Intraoperatively noted to have significant omental adhesions to gallbladder consistent with chronic cholecystitis.  - PRN Toradol 15mg Q6H, oxycodone 5-10mg Q4H, and dilaudid 03.-0.5mg Q2H ordered by gen surg.    After evaluating patient at bedside shortly after arrival from PACU and in anticipation of sub-optimal pain control d/t underlying opioid dependence on Suboxone will change PO oxycodone to PO dilaudid 2-4mg Q4H and inc dose IV dilaudid 0.5-1mg Q2H.     Discussed trial and error approach with patient and discussed with nursing is conceivable she may need inc dose if remains with inadequate pain control; would then suggest PO dilaudid 4-6mg Q4H PRN and IV dilaudid 1-2mg IV Q3H PRN.     Chronic opioid dependence.   Depression.  - Cont PTA Cymbalta, Lyrica, Suboxone. Anticipate need for increased analgesia than patient not on Suboxone.      Leukopenia.  Appears chronic.  - Recommend outpatient hematology evaluation.     Ovarian cyst, 4cm. - new.  Incidental finding on CT abdomen / pelvis from 2/11.  - Recommend outpatient OB/GYN evaluation.    Seizure disorder.  -  "Continue PTA Keppra 1000mg BID.     HTN.  - Cont PTA amlodipine.  - Plan resume lisinopril 2/25 if blood pressure allows.    Obesity, BMI 33.95.  - Educated on the harmful effects of obesity and will benefit from low-fat and low-calorie diet and aerobic exercise.     Clinically Significant Risk Factors Present on Admission                 # Obesity: Estimated body mass index is 33.95 kg/m  as calculated from the following:    Height as of this encounter: 1.651 m (5' 5\").    Weight as of this encounter: 92.5 kg (204 lb).        Diet: Diet    Kasper Catheter: Not present    DVT Prophylaxis: Pneumatic Compression Devices  Code Status: Full Code    Expected Discharge: 02/24/2022     Anticipated discharge location:  Awaiting care coordination huddle  Delays:       The patient's care was discussed with the Attending Physician, Dr. Marshall, Bedside Nurse, Care Coordinator/ and Patient.    JoAnna K. Barthell, PA-C  Hospitalist Service  [facility]    ______________________________________________________________________    Interval History   Reporting back and abdominal pain 10/10. States lives at 8/10 on daily basis. States often does not take Suboxone 3 times daily, but rather twice. Received several doses fentanyl, ketamine 20mg, dilaudid 1.5mg post operatively in PACU.    Data reviewed today: I reviewed all medications, new labs and imaging results over the last 24 hours.    Physical Exam   Vital Signs: Temp: 98.1  F (36.7  C) Temp src: Temporal BP: (!) 136/121 Pulse: 79   Resp: 20 SpO2: 97 % O2 Device: Simple face mask Oxygen Delivery: 6 LPM  Weight: 204 lbs 0 oz  Constitutional: Pleasant, appears stated age. Cautious movements to avoid pain exacerbation.  Respiratory: Breath sounds CTA. No increased work of breathing.  Cardiovascular: RRR, no rub or murmur. No peripheral edema.  GI: Soft, mild distended, lap site c/d/i.  Skin: Warm, dry, no rashes or lesions.    Medications     " lactated ringers       sodium chloride 75 mL/hr at 02/24/22 0207       [Auto Hold] amLODIPine  5 mg Oral Daily     [Auto Hold] buprenorphine HCl-naloxone HCl  2 Film Sublingual Daily with supper     [Auto Hold] Buprenorphine HCl-Naloxone HCl  1 Film Sublingual QAM     [Auto Hold] DULoxetine  120 mg Oral Daily     [Auto Hold] levETIRAcetam  1,000 mg Oral BID     [Auto Hold] pregabalin  100 mg Oral QAM     [Auto Hold] pregabalin  200 mg Oral QPM       Data   Recent Labs   Lab 02/24/22  0714 02/23/22 1958 02/23/22  1824   WBC 3.2*  --  3.7*   HGB 11.2*  --  12.8   MCV 93  --  95     --  318     --  140   POTASSIUM 3.4  --  3.5   CHLORIDE 108  --  109   CO2 29  --  24   BUN 7  --  5*   CR 0.51*  --  0.64   ANIONGAP 2*  --  7   LATASHA 8.1*  --  8.7   GLC 77 106* 69*   ALBUMIN 2.8*  --  3.4   PROTTOTAL 5.7*  --  6.7*   BILITOTAL 0.2  --  0.1*   ALKPHOS 66  --  88   ALT 21  --  26   AST 17  --  22   LIPASE  --   --  79       Imaging:  Recent Results (from the past 24 hour(s))   US Abdomen Limited (RUQ)    Narrative    EXAM: US ABDOMEN LIMITED  LOCATION: [facility]  DATE/TIME: 2/23/2022 8:05 PM    INDICATION: Abdominal pain, known gallstones  COMPARISON: 02/11/2022  TECHNIQUE: Limited abdominal ultrasound.    FINDINGS:    GALLBLADDER: Cholelithiasis and sludge with normal wall thickness. Negative sonographic Santiago's.    BILE DUCTS: No biliary dilatation. The common duct measures 5 mm.    LIVER: Normal parenchyma with smooth contour. No focal mass.    RIGHT KIDNEY: No hydronephrosis.    PANCREAS: The visualized portions are normal.    No ascites.      Impression    IMPRESSION:  1.  Cholelithiasis and sludge without evidence for acute cholecystitis.

## 2022-02-24 NOTE — H&P
[facility]    History and Physical - Hospitalist Service       Date of Admission:  2/23/2022    Assessment & Plan      Bernarda Leblanc is a 46 year old female admitted on 2/23/2022. She has medical problems which include hypertension, obesity, seizure disorder, depression, status post gastric bypass and recently diagnosed cholelithiasis who presented to the ER for persistent abdominal pain    1) cholelithiasis with biliary colic  -She presented to the ED on 2/11 and work-up at that time included ultrasound abdomen and a CT scan of the abdomen and pelvis which was consistent with cholelithiasis and she was scheduled to have cholecystectomy as outpatient on coming Monday but presented today with persistent abdominal pain  -On examination patient has mild tenderness over the right upper quadrant and her lipase has been normal and repeat ultrasound showed cholelithiasis and sludge without any evidence of cholecystitis  -She has mild leukopenia which seems to be chronic and no other evidence of cholecystitis  -We will keep the patient n.p.o. after midnight, IV fluids, IV pain medications and surgery has been consulted for likely cholecystectomy for persistent biliary colic    2) seizure disorder  -We will continue with the patient with her home medications Keppra 1000 mg twice daily and she has taken the morning dose    3) hypertension  -Her prior to arrival medications include amlodipine and lisinopril and will need to be started after they are verified by pharmacy    4) depression  -Her PTA can be started in the morning after they are verified by the pharmacy    5) leukopenia  -She is white count of 3.7 and I reviewed in care everywhere and it seems chronic and will need to have work-up as outpatient by hematology team    6) 4 cm ovarian cyst  -She had CT scan of the abdomen and pelvis done on 2/11 which showed 4 cm left ovarian cyst and will need to follow as outpatient with OB/GYN    7)  "obesity  -She has BMI of 33.95 and was educated on the harmful effects of obesity and will benefit from low-fat and low-calorie diet and aerobic exercise and patient understands the importance    8) Opioid dependence   - will need to verify suboxone dose before starting home regimen     Diet:  NPO After midnight   DVT Prophylaxis: Pneumatic Compression Devices  Kasper Catheter: Not present  Central Lines: None  Cardiac Monitoring: None  Code Status:  Full code and discussed with the patient     Clinically Significant Risk Factors Present on Admission                 # Obesity: Estimated body mass index is 33.95 kg/m  as calculated from the following:    Height as of this encounter: 1.651 m (5' 5\").    Weight as of this encounter: 92.5 kg (204 lb).      Disposition Plan   Expected Discharge:    Anticipated discharge location:  Awaiting care coordination huddle  Delays:           The patient's care was discussed with the Bedside Nurse, Patient and ED physician .    Kelsi Masterson MD  Hospitalist Service  [facility]  Securely message with the Vocera Web Console (learn more here)  Text page via [employer] Paging/Directory         ______________________________________________________________________    Chief Complaint      Abdominal pain     History is obtained from the patient    History of Present Illness   Bernarda Leblanc is a 46 year old female who has medical history which includes seizure disorder, hypertension, opioid dependence, hypertension, obesity, status post gastric bypass who presented to the emergency department with chief complaint of abdominal pain/back pain.  Of note patient was seen in the ER on 2/11 for flank pain and she underwent ultrasound of the abdomen along with CT scan of the abdomen pelvis at that time which showed for centimeter left ovarian cyst, cholelithiasis and evidence of hysterectomy and previous gastric bypass and patient was referred to general surgery for " consideration of cholecystectomy.  She did see Dr. Pereira as outpatient and was scheduled to have cholecystectomy on coming Monday but she presented to the ER with abdominal pain which is uncontrolled and is 12/10 in intensity and patient says it also goes to the back.  She denies any nausea or vomiting but does mention that she has been feeling hot and cold.  She denies any chest pain, shortness of breath, tingling or numbness anywhere else in the body.  He denies any diarrhea constipation.  She denies any upper respiratory tract symptoms including rhinorrhea, sore throat or nasal congestion.    In the ER she had work-up done today and her LFTs were within normal limits, lipase was 79 and she had mild leukopenia with WBC count of 3.7 and this seems to be chronic on review of the electronic medical record.  She was also negative for COVID-19.  She underwent ultrasound of the abdomen and it showed cholelithiasis and sludge without evidence for acute cholecystitis and negative sonographic Santiago sign with no biliary dilatation and CBD measured 5 mm.     I did see the patient in the ER room 27 and she denied any nausea and vomiting but was very hungry and I discussed the plan of the care with the patient including n.p.o. after midnight, IV fluids, IV pain medications and consult to general surgery for likely possibility of cholecystectomy tomorrow and she understands.  She did mention to me that she has not taken her Keppra today and verified the dose and has been taking her blood pressure medications and duloxetine and has taken all of them today.  She did mention that she lives with her sister but the sister has been cancerous for a .    Review of Systems      The 10 point Review of Systems is negative other than noted in the HPI     Past Medical History    I have reviewed this patient's medical history and updated it with pertinent information if needed.   Past Medical History:   Diagnosis Date     Asthma       Benign essential hypertension      Disorder of thyroid      Sleep apnea        Past Surgical History   I have reviewed this patient's surgical history and updated it with pertinent information if needed.  Past Surgical History:   Procedure Laterality Date     GASTRIC BYPASS      1998     GYN SURGERY      hysterectomy       Social History   I have reviewed this patient's social history and updated it with pertinent information if needed.  Social History     Tobacco Use     Smoking status: Current Every Day Smoker     Packs/day: 0.50     Smokeless tobacco: Never Used   Substance Use Topics     Alcohol use: No     Drug use: No       Family History   I have reviewed this patient's family history and updated it with pertinent information if needed.  Family History   Problem Relation Age of Onset     Hypertension Mother      Hypertension Father      Diabetes Daughter        Prior to Admission Medications   Prior to Admission Medications   Prescriptions Last Dose Informant Patient Reported? Taking?   Buprenorphine HCl-Naloxone HCl (SUBOXONE) 12-3 MG FILM per film Past Week at Unknown time  Yes Yes   Sig: Place 1 Film under the tongue 2 times daily   DULoxetine (CYMBALTA) 60 MG capsule 2/23/2022 at Unknown time  Yes Yes   Sig: Take 120 mg by mouth daily   SUMAtriptan (IMITREX) 100 MG tablet 2/22/2022 at Unknown time  Yes Yes   Sig: Take 100 mg by mouth at onset of headache for migraine   amLODIPine (NORVASC) 5 MG tablet 2/23/2022 at Unknown time  Yes Yes   Sig: Take 5 mg by mouth daily   buprenorphine HCl-naloxone HCl (SUBOXONE) 8-2 MG per film 2/23/2022 at Unknown time  Yes Yes   Sig: Place 1 Film under the tongue daily   levETIRAcetam (KEPPRA) 500 MG tablet 2/23/2022 at am  Yes Yes   Sig: Take 1,000 mg by mouth 2 times daily   lisinopril (PRINIVIL/ZESTRIL) 5 MG tablet 2/23/2022 at Unknown time  Yes Yes   Sig: Take 5 mg by mouth daily   minoxidil (LONITEN) 2.5 MG tablet 2/23/2022 at Unknown time  Yes Yes   Sig: Take 5  mg by mouth daily   multivitamin, therapeutic (THERA-VIT) TABS tablet 2022 at Unknown time  Yes Yes   Sig: Take 1 tablet by mouth daily   pregabalin (LYRICA) 100 MG capsule 2022 at Unknown time  Yes Yes   Si in the morning, and 2 tabs in the evening.   senna (SENOKOT) 8.6 MG tablet 2022 at Unknown time  Yes Yes   Sig: Take 2 tablets by mouth daily   zolpidem ER (AMBIEN CR) 12.5 MG CR tablet 2022 at Unknown time  Yes Yes   Sig: Take 12.5 mg by mouth nightly as needed for sleep      Facility-Administered Medications: None     Allergies   No Known Allergies    Physical Exam   Vital Signs: Temp: 98.5  F (36.9  C) Temp src: Oral BP: 120/72 Pulse: 85   Resp: 20 SpO2: 97 % O2 Device: None (Room air)    Weight: 204 lbs 0 oz        General: Patient appears comfortable and in no acute distress.  HEENT: Head is atraumatic, normocephalic.  Pupils are equal, round and reactive to light.  No scleral icterus. Conjunctiva are pink and without injection.  Inspection of oropharynx reveals pink, moist buccal mucosa without cobblestoning or erythema of the posterior pharynx.  Midline uvula.  Tongue without lesions.    Neck: Neck is supple .  Lymphatic: No Lnpathy  Respiratory: Lungs are clear to auscultation bilaterally. No wheezes, rhonchi bilaterally.  Cardiovascular: Regular rate and rhythm.  Normal S1 and S2.  No murmurs, rubs, or gallops.  No pretibial edema noted.  Abdomen:   Normal to visual inspection. Soft, No guarding, rigidity.  Normoactive bowel sounds.  Mild tender to palpation.    Skin: No skin rashes or lesions to inspection or palpation.  Neurologic: Higher functions are within normal limits. No obvious defects in speech, language and memory.   Musculoskeletal: There is full range of motion in the upper and lower extremities bilaterally.  Strength is 5/5 in all the major muscle groups in the upper and lower extremities bilaterally.    Psychiatric: Affect is not blunted and mood is appropriate.  Thinking and behavior are appropriate.      Data   Data reviewed today: I reviewed all medications, new labs and imaging results over the last 24 hours. I personally reviewed the US abdomen limited image(s) showing Cholelithiasis and sludge without evidence for acute cholecystitis..    Recent Labs   Lab 02/23/22 1958 02/23/22 1824   WBC  --  3.7*   HGB  --  12.8   MCV  --  95   PLT  --  318   NA  --  140   POTASSIUM  --  3.5   CHLORIDE  --  109   CO2  --  24   BUN  --  5*   CR  --  0.64   ANIONGAP  --  7   LATASHA  --  8.7   * 69*   ALBUMIN  --  3.4   PROTTOTAL  --  6.7*   BILITOTAL  --  0.1*   ALKPHOS  --  88   ALT  --  26   AST  --  22   LIPASE  --  79     Most Recent 3 CBC's:Recent Labs   Lab Test 02/23/22 1824 02/11/22 2051 11/19/19 2016   WBC 3.7* 3.2* 3.4*   HGB 12.8 11.6* 10.8*   MCV 95 92 92    256 275     Recent Results (from the past 24 hour(s))   US Abdomen Limited (RUQ)    Narrative    EXAM: US ABDOMEN LIMITED  LOCATION: [facility]  DATE/TIME: 2/23/2022 8:05 PM    INDICATION: Abdominal pain, known gallstones  COMPARISON: 02/11/2022  TECHNIQUE: Limited abdominal ultrasound.    FINDINGS:    GALLBLADDER: Cholelithiasis and sludge with normal wall thickness. Negative sonographic Santiago's.    BILE DUCTS: No biliary dilatation. The common duct measures 5 mm.    LIVER: Normal parenchyma with smooth contour. No focal mass.    RIGHT KIDNEY: No hydronephrosis.    PANCREAS: The visualized portions are normal.    No ascites.      Impression    IMPRESSION:  1.  Cholelithiasis and sludge without evidence for acute cholecystitis.

## 2022-02-24 NOTE — PROGRESS NOTES
RECEIVING UNIT ED HANDOFF REVIEW    ED Nurse Handoff Report was reviewed by: Ian Reese RN on February 24, 2022 at 12:01 AM

## 2022-02-25 LAB
ANION GAP SERPL CALCULATED.3IONS-SCNC: 4 MMOL/L (ref 3–14)
BUN SERPL-MCNC: 9 MG/DL (ref 7–30)
CALCIUM SERPL-MCNC: 8.7 MG/DL (ref 8.5–10.1)
CHLORIDE BLD-SCNC: 105 MMOL/L (ref 94–109)
CO2 SERPL-SCNC: 28 MMOL/L (ref 20–32)
CREAT SERPL-MCNC: 0.67 MG/DL (ref 0.52–1.04)
GFR SERPL CREATININE-BSD FRML MDRD: >90 ML/MIN/1.73M2
GLUCOSE BLD-MCNC: 98 MG/DL (ref 70–99)
HGB BLD-MCNC: 11.1 G/DL (ref 11.7–15.7)
PATH REPORT.COMMENTS IMP SPEC: NORMAL
PATH REPORT.COMMENTS IMP SPEC: NORMAL
PATH REPORT.FINAL DX SPEC: NORMAL
PATH REPORT.GROSS SPEC: NORMAL
PATH REPORT.MICROSCOPIC SPEC OTHER STN: NORMAL
PATH REPORT.RELEVANT HX SPEC: NORMAL
PHOTO IMAGE: NORMAL
POTASSIUM BLD-SCNC: 3.3 MMOL/L (ref 3.4–5.3)
SODIUM SERPL-SCNC: 137 MMOL/L (ref 133–144)

## 2022-02-25 PROCEDURE — 250N000013 HC RX MED GY IP 250 OP 250 PS 637: Performed by: NURSE PRACTITIONER

## 2022-02-25 PROCEDURE — 250N000011 HC RX IP 250 OP 636: Performed by: PHYSICIAN ASSISTANT

## 2022-02-25 PROCEDURE — 36415 COLL VENOUS BLD VENIPUNCTURE: CPT | Performed by: PHYSICIAN ASSISTANT

## 2022-02-25 PROCEDURE — 250N000013 HC RX MED GY IP 250 OP 250 PS 637: Performed by: PHYSICIAN ASSISTANT

## 2022-02-25 PROCEDURE — G0378 HOSPITAL OBSERVATION PER HR: HCPCS

## 2022-02-25 PROCEDURE — 250N000013 HC RX MED GY IP 250 OP 250 PS 637: Performed by: INTERNAL MEDICINE

## 2022-02-25 PROCEDURE — 88304 TISSUE EXAM BY PATHOLOGIST: CPT | Mod: 26 | Performed by: PATHOLOGY

## 2022-02-25 PROCEDURE — 250N000011 HC RX IP 250 OP 636: Performed by: SURGERY

## 2022-02-25 PROCEDURE — 96376 TX/PRO/DX INJ SAME DRUG ADON: CPT

## 2022-02-25 PROCEDURE — 999N000128 HC STATISTIC PERIPHERAL IV START W/O US GUIDANCE

## 2022-02-25 PROCEDURE — 99225 PR SUBSEQUENT OBSERVATION CARE,LEVEL II: CPT | Performed by: PHYSICIAN ASSISTANT

## 2022-02-25 PROCEDURE — 258N000003 HC RX IP 258 OP 636: Performed by: PHYSICIAN ASSISTANT

## 2022-02-25 PROCEDURE — 85018 HEMOGLOBIN: CPT | Performed by: PHYSICIAN ASSISTANT

## 2022-02-25 PROCEDURE — 82310 ASSAY OF CALCIUM: CPT | Performed by: PHYSICIAN ASSISTANT

## 2022-02-25 RX ORDER — POTASSIUM CHLORIDE 1.5 G/1.58G
40 POWDER, FOR SOLUTION ORAL ONCE
Status: COMPLETED | OUTPATIENT
Start: 2022-02-25 | End: 2022-02-25

## 2022-02-25 RX ORDER — HYDROMORPHONE HYDROCHLORIDE 2 MG/1
2 TABLET ORAL EVERY 6 HOURS PRN
Qty: 12 TABLET | Refills: 0 | Status: SHIPPED | OUTPATIENT
Start: 2022-02-25 | End: 2022-02-26

## 2022-02-25 RX ORDER — BUPRENORPHINE AND NALOXONE 8; 2 MG/1; MG/1
2 FILM, SOLUBLE BUCCAL; SUBLINGUAL
Status: SHIPPED | DISCHARGE
Start: 2022-02-25

## 2022-02-25 RX ORDER — ACETAMINOPHEN 325 MG/1
975 TABLET ORAL 3 TIMES DAILY
Status: DISCONTINUED | OUTPATIENT
Start: 2022-02-25 | End: 2022-02-26 | Stop reason: HOSPADM

## 2022-02-25 RX ORDER — BUPRENORPHINE AND NALOXONE 8; 2 MG/1; MG/1
2 FILM, SOLUBLE BUCCAL; SUBLINGUAL EVERY MORNING
Status: SHIPPED | DISCHARGE
Start: 2022-02-26

## 2022-02-25 RX ADMIN — KETOROLAC TROMETHAMINE 15 MG: 15 INJECTION, SOLUTION INTRAMUSCULAR; INTRAVENOUS at 04:59

## 2022-02-25 RX ADMIN — BUPRENORPHINE AND NALOXONE 2 FILM: 8; 2 FILM, SOLUBLE BUCCAL; SUBLINGUAL at 16:34

## 2022-02-25 RX ADMIN — HYDROMORPHONE HYDROCHLORIDE 4 MG: 2 TABLET ORAL at 08:14

## 2022-02-25 RX ADMIN — HYDROMORPHONE HYDROCHLORIDE 4 MG: 2 TABLET ORAL at 19:20

## 2022-02-25 RX ADMIN — HYDROMORPHONE HYDROCHLORIDE 4 MG: 2 TABLET ORAL at 14:07

## 2022-02-25 RX ADMIN — KETOROLAC TROMETHAMINE 15 MG: 15 INJECTION, SOLUTION INTRAMUSCULAR; INTRAVENOUS at 11:06

## 2022-02-25 RX ADMIN — ACETAMINOPHEN 975 MG: 325 TABLET, FILM COATED ORAL at 16:34

## 2022-02-25 RX ADMIN — ACETAMINOPHEN 975 MG: 325 TABLET, FILM COATED ORAL at 21:48

## 2022-02-25 RX ADMIN — ACETAMINOPHEN 975 MG: 325 TABLET, FILM COATED ORAL at 11:52

## 2022-02-25 RX ADMIN — HYDROMORPHONE HYDROCHLORIDE 1 MG: 1 INJECTION, SOLUTION INTRAMUSCULAR; INTRAVENOUS; SUBCUTANEOUS at 11:52

## 2022-02-25 RX ADMIN — HYDROMORPHONE HYDROCHLORIDE 0.5 MG: 1 INJECTION, SOLUTION INTRAMUSCULAR; INTRAVENOUS; SUBCUTANEOUS at 16:41

## 2022-02-25 RX ADMIN — HYDROMORPHONE HYDROCHLORIDE 4 MG: 2 TABLET ORAL at 03:39

## 2022-02-25 RX ADMIN — BUPRENORPHINE AND NALOXONE 2 FILM: 8; 2 FILM, SOLUBLE BUCCAL; SUBLINGUAL at 08:14

## 2022-02-25 RX ADMIN — SODIUM CHLORIDE, POTASSIUM CHLORIDE, SODIUM LACTATE AND CALCIUM CHLORIDE 500 ML: 600; 310; 30; 20 INJECTION, SOLUTION INTRAVENOUS at 15:30

## 2022-02-25 RX ADMIN — DULOXETINE 120 MG: 60 CAPSULE, DELAYED RELEASE ORAL at 08:14

## 2022-02-25 RX ADMIN — ZOLPIDEM TARTRATE 5 MG: 5 TABLET ORAL at 21:48

## 2022-02-25 RX ADMIN — HYDROMORPHONE HYDROCHLORIDE 0.5 MG: 1 INJECTION, SOLUTION INTRAMUSCULAR; INTRAVENOUS; SUBCUTANEOUS at 22:22

## 2022-02-25 RX ADMIN — POTASSIUM CHLORIDE 40 MEQ: 1.5 POWDER, FOR SOLUTION ORAL at 14:00

## 2022-02-25 NOTE — ANESTHESIA POSTPROCEDURE EVALUATION
Patient: Bernarda Leblanc    Procedure: Procedure(s):  CHOLECYSTECTOMY, ROBOT-ASSISTED, LAPAROSCOPIC, WITHOUT CHOLANGIOGRAM       Anesthesia Type:  General    Note:     Postop Pain Control: Uneventful            Sign Out: ONGOING pain issues   PONV: No   Neuro/Psych: Uneventful            Sign Out: Acceptable/Baseline neuro status   Airway/Respiratory: Uneventful            Sign Out: Acceptable/Baseline resp. status   CV/Hemodynamics: Uneventful            Sign Out: Acceptable CV status; No obvious hypovolemia; No obvious fluid overload   Other NRE: NONE   DID A NON-ROUTINE EVENT OCCUR? No    Event details/Postop Comments:  Patient with a history of suboxone use, stopped 2 days before surgery. Pain difficult to manage.            Last vitals:  Vitals Value Taken Time   /86 02/24/22 1540   Temp 36.6  C (97.8  F) 02/24/22 1510   Pulse 70 02/24/22 1542   Resp 14 02/24/22 1542   SpO2 100 % 02/24/22 1545   Vitals shown include unvalidated device data.    Electronically Signed By: Erik Manzano MD  February 24, 2022  6:39 PM

## 2022-02-25 NOTE — PROGRESS NOTES
Patient vital signs are at baseline: Yes  Patient able to ambulate as they were prior to admission or with assist devices provided by therapies during their stay:  Yes  Patient MUST void prior to discharge:  Yes  Patient able to tolerate oral intake:  Yes  Pain has adequate pain control using Oral analgesics:  No,  Reason:  Still requiring IV dialudid for adequate pain control.    Jackelyn Marquis RN

## 2022-02-25 NOTE — PLAN OF CARE
[id number]:  Patient A&Ox4. IV and PO dilaudid and toradol given for abdominal/back pain. Denies nausea/vomiting. Up independent in room. VSS ex hypertensive -160's.

## 2022-02-25 NOTE — PROGRESS NOTES
A&O x4. VSS on RA except 's, tachycardic.Up independently to BR. Voiding adequately. Liquid bandage  on abdominal incision intact. Pain managed  With IV Toradol and oral Dilaudid. PIV SL.Will continue to monitor.

## 2022-02-25 NOTE — PLAN OF CARE
Goal Outcome Evaluation:    Plan of Care Reviewed With: patient       Problem: Pain (Surgery Nonspecified)  Goal: Acceptable Pain Control  Outcome: Ongoing, Progressing  Intervention: Prevent or Manage Pain  Recent Flowsheet Documentation  Taken 2/25/2022 1407 by Jackelyn Marquis RN  Pain Management Interventions: medication (see MAR)     Problem: Plan of Care - These are the overarching goals to be used throughout the patient stay.    Goal: Optimal Comfort and Wellbeing  Outcome: Ongoing, Progressing  Intervention: Monitor Pain and Promote Comfort  Recent Flowsheet Documentation  Taken 2/25/2022 1407 by Jackelyn Marquis RN  Pain Management Interventions: medication (see MAR)     Pt receiving pain medication that is effective. Pt still needing IV dilaudid. MD will round again this afternoon.  Most likely discharge in am.     Jackelyn Marquis RN

## 2022-02-25 NOTE — PROVIDER NOTIFICATION
MD Notification    Notified Person: NP    Notified Person Name: Enio    Notification Date/Time: 2/24 2100    Notification Interaction: text page    Purpose of Notification:  pt requesting home dose of ambien be ordered. normally takes 12.5 mg at bedtime.     Orders Received: med ordered

## 2022-02-25 NOTE — PLAN OF CARE
Date/Time 2/24 1500-1930    Trauma/Ortho/Medical (Choose one) lap jac  POD#: COS  Mental Status: 4  Activity/dangle up ind  Diet: cl liquids  Pain: dialudid IV  Kasper/Voiding: BSC  Tele/Restraints/Iso: n/a  02/LDA: IVF   D/C Date: tomorrow?  Other Info: pt has history of opioid dependency

## 2022-02-25 NOTE — PROGRESS NOTES
Patient vital signs are at baseline: Yes  Patient able to ambulate as they were prior to admission or with assist devices provided by therapies during their stay:  Yes  Patient MUST void prior to discharge:  Yes  Patient able to tolerate oral intake:  Yes  Pain has adequate pain control using Oral analgesics:  No,  Reason:  Still requiring IV pain medicine.    Jackelyn Marquis RN

## 2022-02-25 NOTE — PROGRESS NOTES
[facility]    Medicine Progress Note - Hospitalist Service       Date of Admission:  2/23/2022  Assessment & Plan   Bernarda Leblanc is a 46 year old female admitted on 2/23/2022. PMHx including obesity, depression, seizure disorder, HTN, prior gastric bypass surgery, and recently diagnosed cholelithiasis who presented with persistent biliary colic.     Symptomatic cholelithiasis s/p robotic cholecystectomy (2/24/2022)  Initially presented 2/11; ultrasound abdomen and CT abd / pelvis consistent with cholelithiasis. Subsequently scheduled elective cholecystectomy on Monday 2/28.     This adm, LFTs and lipase normal and repeat ultrasound showed cholelithiasis and sludge without any evidence of cholecystitis. No fever, leukocytosis.  Suboptimal postoperative pain control in the setting of chronic suboxone use and now acute pain. She has had 8-10/10 since surgery.  - Appreciate general surgery consult.               - S/p robotic cholecystectomy (2/24/2022). Intraoperatively noted to have significant omental adhesions to gallbladder consistent with chronic cholecystitis.  - BMP, Hgb stable, mild hypokalemia  - Abd binder added, heat/cold packs  - PRN Toradol 15mg Q6H PRN, PO dilaudid 2-4mg Q4H and inc dose IV dilaudid 0.5-1mg Q2H   - May need inc dose if remains with inadequate pain control; would then suggest PO dilaudid 4-6mg Q4H PRN and IV dilaudid 1-2mg IV Q3H PRN  - Pain much improved after IV Dilaudid, if continues to be improved and VSS then will discharge to home. If she continues to need IV Dilaudid then we will keep the patient in the Hospital another night due to inadequate pain control and continued need for IV narcotics and have UR review    ADDENDUM 1630  Pain still a challenge today in the setting of chronic pain, received IV dilaudid earlier and continues to be mildly tachycardic. Receiving a bolus in case she is intravascularly dry. Hgb stable. Pain slowly improving.   -  Will keep 1 more night for close monitoring and pain control and to watch HR.  - Plan for AM discharge as able on 2/26, discharge orders placed other than final order     Chronic opioid dependence.   Depression.  - Cont PTA Cymbalta, Lyrica, Suboxone. Anticipate need for increased analgesia than patient not on Suboxone.    Mild hypokalemia: Will replete with 40mEq x1      Leukopenia.  Appears chronic.  - Recommend outpatient hematology evaluation.     Ovarian cyst, 4cm. - new.  Incidental finding on CT abdomen / pelvis from 2/11.  - Recommend outpatient OB/GYN evaluation.     Seizure disorder: Continue PTA Keppra 1000mg BID.     HTN.  - Cont PTA amlodipine.  - Plan resume lisinopril 2/25 if blood pressure allows.     Obesity class I, BMI 33.95: Educated on the harmful effects of obesity and will benefit from low-fat and low-calorie diet and aerobic exercise.    Asymptomatic COVID-19 admission screening PCR: Negative on admission.  - COVID-19 vaccination status: Fully vaccinated and boosted with Moderna.  - Influenza vaccination status: 2/14/22      Diet: Diet  Regular Diet Adult    DVT Prophylaxis: Pneumatic Compression Devices and Ambulate every shift  Kasper Catheter: Not present  Code Status: Full Code      Disposition Plan   Expected Discharge: 2/25 or 2/26  Anticipated discharge location:  Awaiting care coordination huddle  Delays: pending pain    Entered: Christina Horne PA-C 02/25/2022, 4:40 PM       The patient's care was discussed with the Attending Physician, Dr. Echols, Bedside Nurse and Patient.    TRA Joiner PA-C  Hospitalist JAKI  [facility]  Securely message with the Vocera Web Console (learn more here)  Text page via [employer] Paging/Directory  ______________________________________________________________________    Interval History   Seen and examined. 10/10 pain this morning, given toradol and IV Dilaudid with improvement to pain. Exacerbation of chronic  back pain along with postsurgical abdominal discomfort. No N/V. Taking PO. Pain control challenging in setting of suboxone PTA, chronic pain.     Data reviewed today: I reviewed all medications, new labs and imaging results over the last 24 hours. I personally reviewed no images or EKG's today.    Physical Exam   Vital Signs: Temp: 98.7  F (37.1  C) Temp src: Oral BP: 118/74 Pulse: 107   Resp: 18 SpO2: 94 % O2 Device: None (Room air)    Weight: 204 lbs 0 oz    Physical Exam    General: Awake, alert, very pleasant woman who appears stated age. Acute distress due to pain, 10/10.  HEENT: Normocephalic, atraumatic. Extraocular movements intact.   Respiratory: Clear to auscultation bilaterally, no rales, wheezing, or rhonchi.  Cardiovascular: Regular rate and rhythm, +S1 and S2, no murmur auscultated. No peripheral edema.   Gastrointestinal: Soft, non-tender, non-distended. Hypoactive bowel sounds present.  Skin: Warm, dry. No obvious rashes or lesions on exposed skin. Dorsalis pedis pulses palpable bilaterally.  Musculoskeletal: No joint swelling, erythema or tenderness. Moves all extremities equally.  Neurologic: AAO x3. Cranial nerves 2-12 grossly intact, normal strength and sensation.  Psychiatric: Appropriate mood and affect. No obvious anxiety or depression.    Data   Recent Labs   Lab 02/25/22  1117 02/24/22  0714 02/23/22  1958 02/23/22  1824   WBC  --  3.2*  --  3.7*   HGB 11.1* 11.2*  --  12.8   MCV  --  93  --  95   PLT  --  269  --  318    139  --  140   POTASSIUM 3.3* 3.4  --  3.5   CHLORIDE 105 108  --  109   CO2 28 29  --  24   BUN 9 7  --  5*   CR 0.67 0.51*  --  0.64   ANIONGAP 4 2*  --  7   LATASHA 8.7 8.1*  --  8.7   GLC 98 77 106* 69*   ALBUMIN  --  2.8*  --  3.4   PROTTOTAL  --  5.7*  --  6.7*   BILITOTAL  --  0.2  --  0.1*   ALKPHOS  --  66  --  88   ALT  --  21  --  26   AST  --  17  --  22   LIPASE  --   --   --  79     No results found for this or any previous visit (from the past 24  hour(s)).  Medications       acetaminophen  975 mg Oral TID     amLODIPine  5 mg Oral Daily     buprenorphine HCl-naloxone HCl  2 Film Sublingual Daily with supper     buprenorphine HCl-naloxone HCl  2 Film Sublingual QAM     DULoxetine  120 mg Oral Daily     lactated ringers  500 mL Intravenous Once     lisinopril  5 mg Oral Daily

## 2022-02-25 NOTE — PROGRESS NOTES
[facility]    General Surgery  Daily Post-Op Note       Assessment and Plan:   Bernarda Leblanc is a 46 year old female S/P Procedure(s):  CHOLECYSTECTOMY, ROBOT-ASSISTED, LAPAROSCOPIC, WITHOUT CHOLANGIOGRAM, 1 Day Post-Op    -Medical management per primary team  -Diet as tolerated  -Pain control as needed.  Will add abdominal binder.  Can try alternating between heat and cold packs today PRN pt preference.  Add scheduled Tylenol.  Continue Toradol.  Consider change to oxycodone PO if needed, if pain not improving with these other modalities.  -Incentive spirometry ordered  -Okay to discharge from general surgery standpoint assuming low-grade fever/tachycardia improved and patient cleared by primary team        Interval History:   Patient remained stable postoperatively.  She has pain control issues which make it tough with her history of opioid dependence and taking suboxone.  Was able to tolerate breakfast.             Physical Exam:   Temp: 97.6  F (36.4  C) Temp src: Oral BP: 107/62 Pulse: 107   Resp: 18 SpO2: 94 % O2 Device: None (Room air) Oxygen Delivery: 2 LPM    I/O last 3 completed shifts:  In: 1200 [I.V.:1200]  Out: 410 [Urine:400; Blood:10]      Constitutional: healthy, alert and no distress   Respiratory: Breathing nonlabored  Abdomen: Soft, slightly distended, tender with palpation surrounding surgical sites especially LUQ incision, incisions without drainage or erythema      Data   Recent Labs   Lab 02/24/22  0714 02/23/22 1958 02/23/22  1824   WBC 3.2*  --  3.7*   HGB 11.2*  --  12.8   MCV 93  --  95     --  318     --  140   POTASSIUM 3.4  --  3.5   CHLORIDE 108  --  109   CO2 29  --  24   BUN 7  --  5*   CR 0.51*  --  0.64   ANIONGAP 2*  --  7   LATASHA 8.1*  --  8.7   GLC 77 106* 69*   ALBUMIN 2.8*  --  3.4   PROTTOTAL 5.7*  --  6.7*   BILITOTAL 0.2  --  0.1*   ALKPHOS 66  --  88   ALT 21  --  26   AST 17  --  22     NAZIA Nuno  MD [place]

## 2022-02-25 NOTE — UTILIZATION REVIEW
"  Admission Status; Secondary Review Determination         Under the authority of the Utilization Management Committee, the utilization review process indicated a secondary review on the above patient.  The review outcome is based on review of the medical records, discussions with staff, and applying clinical experience noted on the date of the review.       (x) Observation Status Appropriate - This patient does not meet hospital inpatient criteria and is placed in observation status. If this patient's primary payer is Medicare and was admitted as an inpatient, Condition Code 44 should be used and patient status changed to \"observation\".     RATIONALE FOR DETERMINATION   The patient is a 46-year-old female admitted on 2/23/2022.  Patient was initially scheduled for cholecystectomy for 2/28/2022.  She has had some persisting discomfort and surgery was advanced after being seen in the ED with worsening right upper quadrant discomfort radiating to the back and chills with no fever.  Patient did have a robotic assisted laparoscopic cholecystectomy.  Patient has had some persisting pain but also has a history of pain issues.  Patient did have day 1 postop low-grade fever which is resolved.  Pulses still just above the 100 range.  Hemoglobin is stable.  Surgical conclusion today is that surgery is signing off the case and feels that the patient is stable to be discharged as long as the clinical direction continues.  Based on uncomplicated cholecystectomy with symptomatic cholelithiasis, recommend maintaining observation status.  If the patient's condition postoperatively worsens and additional assessment and treatment is required, would recommend another utilization review and possible switch to inpatient status.  WBC was 3.2 yesterday.      The severity of illness, intensity of service provided, expected LOS and risk for adverse outcome make the care complex, high risk and appropriate for hospital admission.        The " information on this document is developed by the utilization review team in order for the business office to ensure compliance.  This only denotes the appropriateness of proper admission status and does not reflect the quality of care rendered.         The definitions of Inpatient Status and Observation Status used in making the determination above are those provided in the CMS Coverage Manual, Chapter 1 and Chapter 6, section 70.4.      Sincerely,     Ced Horne MD  Physician Advisor  Utilization Review/ Case Management  [facility].

## 2022-02-26 VITALS
TEMPERATURE: 99.2 F | BODY MASS INDEX: 33.99 KG/M2 | SYSTOLIC BLOOD PRESSURE: 167 MMHG | HEART RATE: 93 BPM | RESPIRATION RATE: 16 BRPM | WEIGHT: 204 LBS | OXYGEN SATURATION: 95 % | HEIGHT: 65 IN | DIASTOLIC BLOOD PRESSURE: 102 MMHG

## 2022-02-26 LAB — POTASSIUM BLD-SCNC: 3.5 MMOL/L (ref 3.4–5.3)

## 2022-02-26 PROCEDURE — 99217 PR OBSERVATION CARE DISCHARGE: CPT | Performed by: HOSPITALIST

## 2022-02-26 PROCEDURE — 36415 COLL VENOUS BLD VENIPUNCTURE: CPT | Performed by: SURGERY

## 2022-02-26 PROCEDURE — 250N000011 HC RX IP 250 OP 636: Performed by: SURGERY

## 2022-02-26 PROCEDURE — 250N000013 HC RX MED GY IP 250 OP 250 PS 637: Performed by: PHYSICIAN ASSISTANT

## 2022-02-26 PROCEDURE — 250N000013 HC RX MED GY IP 250 OP 250 PS 637: Performed by: INTERNAL MEDICINE

## 2022-02-26 PROCEDURE — 84132 ASSAY OF SERUM POTASSIUM: CPT | Performed by: SURGERY

## 2022-02-26 PROCEDURE — G0378 HOSPITAL OBSERVATION PER HR: HCPCS

## 2022-02-26 PROCEDURE — 96376 TX/PRO/DX INJ SAME DRUG ADON: CPT

## 2022-02-26 PROCEDURE — 250N000013 HC RX MED GY IP 250 OP 250 PS 637: Performed by: HOSPITALIST

## 2022-02-26 RX ORDER — HYDROMORPHONE HYDROCHLORIDE 2 MG/1
4 TABLET ORAL ONCE
Status: COMPLETED | OUTPATIENT
Start: 2022-02-26 | End: 2022-02-26

## 2022-02-26 RX ORDER — HYDROMORPHONE HYDROCHLORIDE 2 MG/1
2-4 TABLET ORAL EVERY 4 HOURS PRN
Qty: 20 TABLET | Refills: 0 | Status: SHIPPED | OUTPATIENT
Start: 2022-02-26

## 2022-02-26 RX ADMIN — HYDROMORPHONE HYDROCHLORIDE 4 MG: 2 TABLET ORAL at 07:56

## 2022-02-26 RX ADMIN — KETOROLAC TROMETHAMINE 15 MG: 15 INJECTION, SOLUTION INTRAMUSCULAR; INTRAVENOUS at 02:35

## 2022-02-26 RX ADMIN — ACETAMINOPHEN 975 MG: 325 TABLET, FILM COATED ORAL at 08:13

## 2022-02-26 RX ADMIN — LISINOPRIL 5 MG: 5 TABLET ORAL at 08:20

## 2022-02-26 RX ADMIN — AMLODIPINE BESYLATE 5 MG: 5 TABLET ORAL at 08:18

## 2022-02-26 RX ADMIN — DULOXETINE 120 MG: 60 CAPSULE, DELAYED RELEASE ORAL at 08:14

## 2022-02-26 RX ADMIN — HYDROMORPHONE HYDROCHLORIDE 4 MG: 2 TABLET ORAL at 05:19

## 2022-02-26 RX ADMIN — KETOROLAC TROMETHAMINE 15 MG: 15 INJECTION, SOLUTION INTRAMUSCULAR; INTRAVENOUS at 07:57

## 2022-02-26 RX ADMIN — BUPRENORPHINE AND NALOXONE 2 FILM: 8; 2 FILM, SOLUBLE BUCCAL; SUBLINGUAL at 08:14

## 2022-02-26 NOTE — PROGRESS NOTES
Notified Shellie Looney, NP @ 2007    Slademiguel ángel Benji 2308-01, Pt agreed to stay after some service recovery/being offered a different nurse. I think everything is settled now.   Thanks,     Kamilla THOMPSON   [phone]   Implemented All Universal Safety Interventions:  [place] to call system. Call bell, personal items and telephone within reach. Instruct patient to call for assistance. Room bathroom lighting operational. Non-slip footwear when patient is off stretcher. Physically safe environment: no spills, clutter or unnecessary equipment. Stretcher in lowest position, wheels locked, appropriate side rails in place.

## 2022-02-26 NOTE — PLAN OF CARE
A&ox4.  Up independently.  C/o abdominal pain, prn po dilaudid and toradol given and heat pack utilized per patient preference.  Voiding adequately.  Discharge packet and medications reviewed and sent home with the patient.  Patient discharged home at 10:35am.            Goal Outcome Evaluation:    Plan of Care Reviewed With: patient

## 2022-02-26 NOTE — PROVIDER NOTIFICATION
Notified Shellie Looney, FAVIO 2308-01     Bernarda Leblanc 2308-01, Pt wanting to leave the hospital after incident of her walking down to ED by herself. Would you be able to come talk to her? Wants last dose of dilaudid and to leave     Kamilla THOMPSON   [phone]

## 2022-02-26 NOTE — DISCHARGE SUMMARY
[facility]  Hospitalist Discharge Summary      Date of Admission:  2/23/2022  Date of Discharge:  2/26/2022  Discharging Provider: Rafael Echols MD  Discharge Service: Hospitalist Service    Discharge Diagnoses   Symptomatic cholelithiasis s/p lap jac   Chronic opioid dependence  Depression  Mild hypokalemia   Leukopenia   Ovarian cyst   Seizure disorder   HTN  Obesity     Follow-ups Needed After Discharge   Follow-up Appointments     Follow-up and recommended labs and tests      Dr. Olivas's surgical office will contact you in approximately 2 weeks to   check on your progress and answer any questions you may have. If you are   doing well, you will not need to return for a follow up appointment. If   any concerns are identified over the phone, we will help you make an   appointment to see a provider.  If you have not received a phone call, have any questions or concerns, or   would like to be seen, please call us at [phone] and ask to speak   with our nurse. We are located at [address] [place] [address].             Unresulted Labs Ordered in the Past 30 Days of this Admission     Date and Time Order Name Status Description    2/26/2022 12:27 AM Potassium In process       These results will be followed up by: hospitalist service     Discharge Disposition   Discharged to home  Condition at discharge: Stable    Hospital Course   Bernarda Leblanc is a 46 year old female admitted on 2/23/2022.  PMHx including obesity, depression, seizure disorder, HTN, prior gastric bypass surgery, and recently diagnosed cholelithiasis who presented with persistent biliary colic.     Symptomatic cholelithiasis and chronic cholecystitis s/p robotic cholecystectomy (2/24/2022)  Initially presented 2/11; ultrasound abdomen and CT abd / pelvis consistent with cholelithiasis.  She was scheduled for lap jac but presented due to uncontrolled pain.  Admission LFT's and lipase wnl  and repeat US showed cholelithiasis and sludge without any evidence of cholecystitis.  Gen Surg was consulted and she underwent uncomplicated lap jac with intra-op findings of significant omental adhesions to gallbladder consistent with chronic cholecystitis.  Post-op course notable for difficult pain control due to use of Suboxone requiring high dose oral dilaudid for effective control.  She was tolerating diet at discharge and will have phone follow up with Gen Surg in about 2 weeks.      Mild hypokalemia, resolved  Normalized with supplementation    Ovarian cyst, 4cm.   Incidental finding on CT abdomen / pelvis from 2/11.  Recommend outpatient OB/GYN evaluation.      Other chronic medical issues remain stable with no changes made to medication regimen.       Consultations This Hospital Stay   SURGERY GENERAL IP CONSULT    Code Status   Full Code    Time Spent on this Encounter   I, Rafael Echols MD, personally saw the patient today and spent greater than 30 minutes discharging this patient.       Rafael Echols MD  [facility]  [address] [facility] [address]  Phone: [phone]  Fax: [phone]  ______________________________________________________________________    Physical Exam   Vital Signs: Temp: 99.2  F (37.3  C) Temp src: Oral BP: 128/86 Pulse: 100   Resp: 16 SpO2: 93 % O2 Device: None (Room air)    Weight: 204 lbs 0 oz  General Appearance: Well nourished female in NAD  Respiratory: lungs CTAB, no wheezes or crackles, no tachypnea   Cardiovascular: RRR, ,normal s1/s2 without murmur  GI: abdomen soft, normal bowel sounds  Other: Alert and appropriate, cranial nerves grossly intact        Primary Care Physician   [facility]    Discharge Orders      Reason for your hospital stay    Symptomatic cholelithiasis and gallbladder surgery     Follow-up and recommended labs and tests    Dr. Olivas's surgical office will contact you in approximately 2 weeks  to check on your progress and answer any questions you may have. If you are doing well, you will not need to return for a follow up appointment. If any concerns are identified over the phone, we will help you make an appointment to see a provider.  If you have not received a phone call, have any questions or concerns, or would like to be seen, please call us at [phone] and ask to speak with our nurse. We are located at [address] [place] [address].     Activity    Please see attached discharge instructions.     Discharge Instructions    Continue with your usual dosing of Suboxone  Take tylenol 1000 mg every 8 hours for pain control (do not take more than 4000mg in 24 hours).   Take ibuprofen 600 mg every 8 hours as needed for additional pain control (it is best to space this between tylenol doses) for up to 5 days.     Diet    Please see attached discharge instructions.       Significant Results and Procedures   Most Recent 3 CBC's:Recent Labs   Lab Test 02/25/22 1117 02/24/22 0714 02/23/22 1824 02/11/22 2051   WBC  --  3.2* 3.7* 3.2*   HGB 11.1* 11.2* 12.8 11.6*   MCV  --  93 95 92   PLT  --  269 318 256     Most Recent 3 BMP's:Recent Labs   Lab Test 02/25/22 1117 02/24/22 0714 02/23/22 1958 02/23/22 1824    139  --  140   POTASSIUM 3.3* 3.4  --  3.5   CHLORIDE 105 108  --  109   CO2 28 29  --  24   BUN 9 7  --  5*   CR 0.67 0.51*  --  0.64   ANIONGAP 4 2*  --  7   LATASHA 8.7 8.1*  --  8.7   GLC 98 77 106* 69*     Most Recent 2 LFT's:Recent Labs   Lab Test 02/24/22 0714 02/23/22 1824   AST 17 22   ALT 21 26   ALKPHOS 66 88   BILITOTAL 0.2 0.1*     Most Recent 3 Hemoglobins:Recent Labs   Lab Test 02/25/22 1117 02/24/22 0714 02/23/22 1824   HGB 11.1* 11.2* 12.8   ,   Results for orders placed or performed during the hospital encounter of 02/23/22   US Abdomen Limited (RUQ)    Narrative    EXAM: US ABDOMEN LIMITED  LOCATION: [facility]  DATE/TIME:  2/23/2022 8:05 PM    INDICATION: Abdominal pain, known gallstones  COMPARISON: 02/11/2022  TECHNIQUE: Limited abdominal ultrasound.    FINDINGS:    GALLBLADDER: Cholelithiasis and sludge with normal wall thickness. Negative sonographic Santiago's.    BILE DUCTS: No biliary dilatation. The common duct measures 5 mm.    LIVER: Normal parenchyma with smooth contour. No focal mass.    RIGHT KIDNEY: No hydronephrosis.    PANCREAS: The visualized portions are normal.    No ascites.      Impression    IMPRESSION:  1.  Cholelithiasis and sludge without evidence for acute cholecystitis.             Discharge Medications   Current Discharge Medication List      START taking these medications    Details   HYDROmorphone (DILAUDID) 2 MG tablet Take 1-2 tablets (2-4 mg) by mouth every 4 hours as needed for moderate to severe pain  Qty: 20 tablet, Refills: 0    Associated Diagnoses: Postoperative pain         CONTINUE these medications which have CHANGED    Details   !! buprenorphine HCl-naloxone HCl (SUBOXONE) 8-2 MG per film Place 2 Film under the tongue daily (with dinner)    Associated Diagnoses: Chronic pain syndrome      !! buprenorphine HCl-naloxone HCl (SUBOXONE) 8-2 MG per film Place 2 Film under the tongue every morning    Associated Diagnoses: Chronic pain syndrome       !! - Potential duplicate medications found. Please discuss with provider.      CONTINUE these medications which have NOT CHANGED    Details   amLODIPine (NORVASC) 5 MG tablet Take 5 mg by mouth daily      DULoxetine (CYMBALTA) 60 MG capsule Take 120 mg by mouth daily      levETIRAcetam (KEPPRA) 500 MG tablet Take 1,000 mg by mouth 2 times daily      lisinopril (PRINIVIL/ZESTRIL) 5 MG tablet Take 5 mg by mouth daily      minoxidil (LONITEN) 2.5 MG tablet Take 5 mg by mouth daily      multivitamin, therapeutic (THERA-VIT) TABS tablet Take 1 tablet by mouth daily      !! pregabalin (LYRICA) 100 MG capsule Take 200 mg by mouth every evening      !! pregabalin  (LYRICA) 100 MG capsule Take 100 mg by mouth every morning       senna (SENOKOT) 8.6 MG tablet Take 2 tablets by mouth daily      SUMAtriptan (IMITREX) 100 MG tablet Take 100 mg by mouth at onset of headache for migraine      zolpidem ER (AMBIEN CR) 12.5 MG CR tablet Take 12.5 mg by mouth nightly as needed for sleep       !! - Potential duplicate medications found. Please discuss with provider.      STOP taking these medications       Buprenorphine HCl-Naloxone HCl (SUBOXONE) 12-3 MG FILM per film Comments:   Reason for Stopping:             Allergies   No Known Allergies

## 2022-02-26 NOTE — PROVIDER NOTIFICATION
Patient c/o pain not being controlled.  Writer explained not time for prn medications at this time and need to avoid IV dilaudid if she wants to discharge at 11am today.  She stated she want's to go at 11am but that she still needs something right now for pain.  Paged hospitalist to inform.

## 2022-02-26 NOTE — PLAN OF CARE
Date/Time 2/25 1500-1930    Trauma/Ortho/Medical (Choose one) lap jac  POD#: 1  Mental Status: 4  Activity/dangle up ind  Diet: regular  Pain: dialudid IV, PO dilaudid, IV toradol  Kasper/Voiding: BSC  Tele/Restraints/Iso: n/a  02/LDA: NA  D/C Date: tomorrow?  Other Info: pt has history of opioid dependency

## 2022-02-26 NOTE — PLAN OF CARE
Pt is alert & oriented x4. VSS except HR tachy. CMS intact. Denies N/V. SL. Voiding adequately amount. Abd binder on. Up independent in room. Pain on mid back managed with PRN dilaudid / Toradol. Possible discharging home today.

## 2022-02-28 ENCOUNTER — NURSE TRIAGE (OUTPATIENT)
Dept: NURSING | Facility: CLINIC | Age: 47
End: 2022-02-28
Payer: COMMERCIAL

## 2022-02-28 ENCOUNTER — TELEPHONE (OUTPATIENT)
Dept: SURGERY | Facility: CLINIC | Age: 47
End: 2022-02-28
Payer: COMMERCIAL

## 2022-02-28 ENCOUNTER — PATIENT OUTREACH (OUTPATIENT)
Dept: CARE COORDINATION | Facility: CLINIC | Age: 47
End: 2022-02-28
Payer: COMMERCIAL

## 2022-02-28 DIAGNOSIS — Z71.89 OTHER SPECIFIED COUNSELING: ICD-10-CM

## 2022-02-28 NOTE — TELEPHONE ENCOUNTER
Call to [facility] and spoke with scheduling. Asked to be connected with triage to get input on next steps for patient.   Informed that triage team is aware and contacting the PA for recommendations.   Beronica Arora RN   02/28/22 3:28 PM  [facility] Nurse Advisor

## 2022-02-28 NOTE — TELEPHONE ENCOUNTER
Call to [facility] to ask for response to previous request.   Maddi able to give me the routing code to the [facility] triage pool.  Routing now.   Beronica Arora RN   02/28/22 2:59 PM  [facility] Nurse Advisor

## 2022-02-28 NOTE — TELEPHONE ENCOUNTER
"[facility] Health Worker calling with patient, following up with patient post surgery.     Patient had gallbladder surgery on Wednesday and discharged Thursday. Patient reports so much pain under her right ribs with breathing and if any bending causes excruciating pain. Pain is getting worse, \"it is worse than before surgery.\"  Patient is taking dilaudid and tylenol. Patient has been up with pain since 3 am. The pain radiates from her right side below the ribs into her back.  Patient has passed gas and had a bowel movement at home.   Patient has been drinking fluids and walking at home but pain is getting worse.  Patient reports up at night urinating more than normal. Patient does not have a thermometer at home but is having some chills but was having this since before surgery running hot and cold.   Pain rating 9/10. Pain is constant and worse with bending and deep breathing.   Patient has been wearing her abdominal binder and following discharge instructions.   Protocol recommends route to surgical team with call back from nurse within 1 hour.  Please call patient at [phone].   Pharmacy is [facility].   Routing to Dr. Olivas and Vic Arora RN   02/28/22 1:56 PM  [facility] Nurse Advisor      Reason for Disposition    SEVERE post-op pain (e.g., excruciating, pain scale 8-10) that is not controlled with pain medications    Additional Information    Negative: Sounds like a life-threatening emergency to the triager    Negative: Chest pain    Negative: Difficulty breathing    Negative: Acting confused (e.g., disoriented, slurred speech) or excessively sleepy    Negative: Surgical incision symptoms and questions    Negative: Discomfort (pain, burning or stinging) when passing urine and male    Negative: Discomfort (pain, burning or stinging) when passing urine and female    Negative: Constipation    Negative: New or worsening leg (calf, thigh) pain    Negative: New or " worsening leg swelling    Negative: Dizziness is severe, or persists > 24 hours after surgery    Negative: Symptoms arising from use of a urinary catheter (Kasper or Coude)    Negative: Cast problems or questions    Negative: Medication question    Negative: Bright red, wide-spread, sunburn-like rash    Negative: SEVERE headache and after spinal (epidural) anesthesia    Negative: Vomiting and persists > 4 hours    Negative: Vomiting and abdomen looks much more swollen than usual    Negative: Drinking very little and dehydration suspected (e.g., no urine > 12 hours, very dry mouth, very lightheaded)    Negative: Patient sounds very sick or weak to the triager    Negative: Sounds like a serious complication to the triager    Negative: Fever > 100.4 F (38.0 C)    Negative: Caller has URGENT question and triager unable to answer question    Protocols used: POST-OP SYMPTOMS AND [id number]

## 2022-02-28 NOTE — TELEPHONE ENCOUNTER
Procedure:  Robot assisted laparoscopic cholecystectomy without cholangiogram    Date: 02/24/22    Surgeon: Brendon    Patient complaining of severe pain below right rib cage. Pain constant, but worse when trying to take deep breath. Radiating into back and down to hip on that right side    This pain is getting worse    Dilaudid and tylenol do not help the pain    She does not feel short of breath. Does not have a thermometer, but does not feel feverish    Informed patient that she will likely need to present to ER for evaluation    Will discuss with on call provider/PA and call her back    Kellie Kebede, RN-BSN

## 2022-02-28 NOTE — PROGRESS NOTES
"Clinic Care Coordination Contact  [facility]: Post-Discharge Note  SITUATION                                                      Admission:    Admission Date: 02/23/22   Reason for Admission: Symptomatic cholelithiasis and gallbladder surgery  Discharge:   Discharge Date: 02/26/22  Discharge Diagnosis: Symptomatic cholelithiasis and gallbladder surgery    BACKGROUND                                                      Per hospital discharge summary and inpatient provider notes:    Bernarda Leblanc is a 46 year old female who has medical history which includes seizure disorder, hypertension, opioid dependence, hypertension, obesity, status post gastric bypass who presented to the emergency department with chief complaint of abdominal pain/back pain.  Of note patient was seen in the ER on 2/11 for flank pain and she underwent ultrasound of the abdomen along with CT scan of the abdomen pelvis at that time which showed for centimeter left ovarian cyst, cholelithiasis and evidence of hysterectomy and previous gastric bypass and patient was referred to general surgery for consideration of cholecystectomy.  She did see Dr. Pereira as outpatient and was scheduled to have cholecystectomy on coming Monday but she presented to the ER with abdominal pain which is uncontrolled and is 12/10 in intensity and patient says it also goes to the back.  She denies any nausea or vomiting but does mention that she has been feeling hot and cold.  She denies any chest pain, shortness of breath, tingling or numbness anywhere else in the body.  He denies any diarrhea constipation.  She denies any upper respiratory tract symptoms including rhinorrhea, sore throat or nasal congestion.    ASSESSMENT      Enrollment  Primary Care Care Coordination Status: Not a Candidate    Discharge Assessment  How are you doing now that you are home?: \"I'm feeling terrible, it's extremely painful when I breathe\"  How are your symptoms? (Red Flag " symptoms escalate to triage hotline per guidelines): Worsening  Do you feel your condition is stable enough to be safe at home until your provider visit?: No (see comment)  Does the patient have their discharge instructions? : Yes  Does the patient have questions regarding their discharge instructions? : No  Were you started on any new medications or were there changes to any of your previous medications? : Yes  Does the patient have all of their medications?: Yes  Do you have questions regarding any of your medications? : No  Do you have all of your needed medical supplies or equipment (DME)?  (i.e. oxygen tank, CPAP, cane, etc.): Yes              PLAN                                                      Outpatient Plan:   Dr. Olivas's surgical office will contact you in approximately 2 weeks to   check on your progress and answer any questions you may have. If you are   doing well, you will not need to return for a follow up appointment. If   any concerns are identified over the phone, we will help you make an   appointment to see a provider.  If you have not received a phone call, have any questions or concerns, or   would like to be seen, please call us at [phone] and ask to speak   with our nurse. We are located at [address] [place] [address].       No future appointments.      For any urgent concerns, please contact our 24 hour nurse triage line: [phone] ([phone])         Patient stated on the phone that she was experiencing terrible pain while breathing. Patient just had gallbladder surgery and was also experiencing worse pain. Connected patient with nurse triage.    Arelis Munoz  Community Health Worker  Connected Care Resource [facility]  [phone]

## 2022-02-28 NOTE — TELEPHONE ENCOUNTER
Called patient back and advised her to present to ER for further evaluation. Rule out PE/bile leak    Patient in agreement and says her son will bring her to the ER    She is wondering if she will get to go home tonight?    Informed her that if all the tests come back within normal limits and her pain is controlled, she will likely be sent home tonight. However, if any type of post op complication is found, she will have to be admitted    Kellie Kebede RN-BSN

## 2022-03-03 ENCOUNTER — HOSPITAL ENCOUNTER (EMERGENCY)
Facility: CLINIC | Age: 47
Discharge: HOME | End: 2022-03-04
Attending: EMERGENCY MEDICINE | Admitting: EMERGENCY MEDICINE
Payer: COMMERCIAL

## 2022-03-03 ENCOUNTER — APPOINTMENT (OUTPATIENT)
Dept: CT IMAGING | Facility: CLINIC | Age: 47
End: 2022-03-03
Attending: EMERGENCY MEDICINE
Payer: COMMERCIAL

## 2022-03-03 DIAGNOSIS — G89.18 POSTOPERATIVE PAIN: ICD-10-CM

## 2022-03-03 LAB
ALBUMIN SERPL-MCNC: 2.8 G/DL (ref 3.4–5)
ALBUMIN UR-MCNC: NEGATIVE MG/DL
ALP SERPL-CCNC: 89 U/L (ref 40–150)
ALT SERPL W P-5'-P-CCNC: 17 U/L (ref 0–50)
ANION GAP SERPL CALCULATED.3IONS-SCNC: 5 MMOL/L (ref 3–14)
APPEARANCE UR: CLEAR
AST SERPL W P-5'-P-CCNC: 14 U/L (ref 0–45)
BASOPHILS # BLD AUTO: 0 10E3/UL (ref 0–0.2)
BASOPHILS NFR BLD AUTO: 1 %
BILIRUB SERPL-MCNC: 0.2 MG/DL (ref 0.2–1.3)
BILIRUB UR QL STRIP: NEGATIVE
BUN SERPL-MCNC: 7 MG/DL (ref 7–30)
CALCIUM SERPL-MCNC: 8.2 MG/DL (ref 8.5–10.1)
CHLORIDE BLD-SCNC: 105 MMOL/L (ref 94–109)
CO2 SERPL-SCNC: 29 MMOL/L (ref 20–32)
COLOR UR AUTO: ABNORMAL
CREAT SERPL-MCNC: 0.51 MG/DL (ref 0.52–1.04)
EOSINOPHIL # BLD AUTO: 0.2 10E3/UL (ref 0–0.7)
EOSINOPHIL NFR BLD AUTO: 4 %
ERYTHROCYTE [DISTWIDTH] IN BLOOD BY AUTOMATED COUNT: 13.7 % (ref 10–15)
GFR SERPL CREATININE-BSD FRML MDRD: >90 ML/MIN/1.73M2
GLUCOSE BLD-MCNC: 86 MG/DL (ref 70–99)
GLUCOSE UR STRIP-MCNC: NEGATIVE MG/DL
HCT VFR BLD AUTO: 34.6 % (ref 35–47)
HGB BLD-MCNC: 11.5 G/DL (ref 11.7–15.7)
HGB UR QL STRIP: NEGATIVE
IMM GRANULOCYTES # BLD: 0.1 10E3/UL
IMM GRANULOCYTES NFR BLD: 2 %
KETONES UR STRIP-MCNC: ABNORMAL MG/DL
LEUKOCYTE ESTERASE UR QL STRIP: NEGATIVE
LIPASE SERPL-CCNC: 40 U/L (ref 73–393)
LYMPHOCYTES # BLD AUTO: 1.6 10E3/UL (ref 0.8–5.3)
LYMPHOCYTES NFR BLD AUTO: 37 %
MCH RBC QN AUTO: 31.3 PG (ref 26.5–33)
MCHC RBC AUTO-ENTMCNC: 33.2 G/DL (ref 31.5–36.5)
MCV RBC AUTO: 94 FL (ref 78–100)
MONOCYTES # BLD AUTO: 0.4 10E3/UL (ref 0–1.3)
MONOCYTES NFR BLD AUTO: 8 %
MUCOUS THREADS #/AREA URNS LPF: PRESENT /LPF
NEUTROPHILS # BLD AUTO: 2.1 10E3/UL (ref 1.6–8.3)
NEUTROPHILS NFR BLD AUTO: 48 %
NITRATE UR QL: NEGATIVE
NRBC # BLD AUTO: 0 10E3/UL
NRBC BLD AUTO-RTO: 1 /100
PH UR STRIP: 6 [PH] (ref 5–7)
PLATELET # BLD AUTO: 327 10E3/UL (ref 150–450)
POTASSIUM BLD-SCNC: 3.2 MMOL/L (ref 3.4–5.3)
PROT SERPL-MCNC: 6.5 G/DL (ref 6.8–8.8)
RBC # BLD AUTO: 3.67 10E6/UL (ref 3.8–5.2)
RBC URINE: <1 /HPF
SODIUM SERPL-SCNC: 139 MMOL/L (ref 133–144)
SP GR UR STRIP: 1.02 (ref 1–1.03)
SQUAMOUS EPITHELIAL: 1 /HPF
UROBILINOGEN UR STRIP-MCNC: NORMAL MG/DL
WBC # BLD AUTO: 4.2 10E3/UL (ref 4–11)
WBC URINE: <1 /HPF

## 2022-03-03 PROCEDURE — 99285 EMERGENCY DEPT VISIT HI MDM: CPT | Mod: 25

## 2022-03-03 PROCEDURE — 80053 COMPREHEN METABOLIC PANEL: CPT | Performed by: EMERGENCY MEDICINE

## 2022-03-03 PROCEDURE — 250N000011 HC RX IP 250 OP 636: Performed by: EMERGENCY MEDICINE

## 2022-03-03 PROCEDURE — 250N000009 HC RX 250: Performed by: EMERGENCY MEDICINE

## 2022-03-03 PROCEDURE — 74177 CT ABD & PELVIS W/CONTRAST: CPT

## 2022-03-03 PROCEDURE — 85018 HEMOGLOBIN: CPT | Performed by: EMERGENCY MEDICINE

## 2022-03-03 PROCEDURE — 81001 URINALYSIS AUTO W/SCOPE: CPT | Performed by: EMERGENCY MEDICINE

## 2022-03-03 PROCEDURE — 83690 ASSAY OF LIPASE: CPT | Performed by: EMERGENCY MEDICINE

## 2022-03-03 PROCEDURE — 36415 COLL VENOUS BLD VENIPUNCTURE: CPT | Performed by: EMERGENCY MEDICINE

## 2022-03-03 RX ORDER — IOPAMIDOL 755 MG/ML
103 INJECTION, SOLUTION INTRAVASCULAR ONCE
Status: COMPLETED | OUTPATIENT
Start: 2022-03-03 | End: 2022-03-03

## 2022-03-03 RX ADMIN — SODIUM CHLORIDE 69 ML: 900 INJECTION INTRAVENOUS at 23:57

## 2022-03-03 RX ADMIN — IOPAMIDOL 103 ML: 755 INJECTION, SOLUTION INTRAVENOUS at 23:57

## 2022-03-03 ASSESSMENT — ENCOUNTER SYMPTOMS
WOUND: 1
COLOR CHANGE: 1
ABDOMINAL PAIN: 1

## 2022-03-04 ENCOUNTER — TELEPHONE (OUTPATIENT)
Dept: SURGERY | Facility: CLINIC | Age: 47
End: 2022-03-04
Payer: COMMERCIAL

## 2022-03-04 VITALS
BODY MASS INDEX: 33.99 KG/M2 | RESPIRATION RATE: 18 BRPM | WEIGHT: 204 LBS | HEART RATE: 78 BPM | TEMPERATURE: 98.6 F | DIASTOLIC BLOOD PRESSURE: 91 MMHG | HEIGHT: 65 IN | OXYGEN SATURATION: 99 % | SYSTOLIC BLOOD PRESSURE: 132 MMHG

## 2022-03-04 LAB
HOLD SPECIMEN: NORMAL

## 2022-03-04 NOTE — ED PROVIDER NOTES
History     Chief Complaint:  Abdominal Pain     The history is provided by the patient.      Bernarda Leblanc is a 46 year old female with a history of asthma, hypertension, seizure who presents with pain to her left upper quadrant incision site as well as the right side of her abdomen after a laparoscopic cholecystectomy which she had 7 days ago on 2/24. She states that since this surgery, she has been experiencing redness and pain to the touch of her incision site as well as right-sided abdominal pain. Today, she reports that her pain and redness had improved, but as she was bending down to tie her shoes while shopping today, her pain worsened. The pain worsens with deep inspiration and when she changes body position. She also notes some associated swelling of her bilateral feet.    Review of Systems   Cardiovascular: Positive for leg swelling (bilateral feet).   Gastrointestinal: Positive for abdominal pain.   Skin: Positive for color change and wound.   10 systems reviewed and negative except as above and in HPI.     Allergies:  Ibuprofen     Medications:  Amlodipine  Suboxone  Cymbalta  Dilaudid  Keppra  Lisinopril  Loniten  Lyrica  Senokot  Imitrex  Ambien  Omeprazole   Flexeril  Albuterol inhaler  Flovent inhaler   Lactulose   Lasix  Vitamin D     Past Medical History:     Asthma  Hypertension  Thyroid disorder  DIANE  Biliary colic  Opioid dependence  Migraine  Seizure  Insomnia  Alopecia   Secondary amenorrhea  Sickle cell trait  Herniated lumbar intervertebral disc  Anxiety  Depression  Nicotine dependence  Vitamin D deficiency    Past Surgical History:    Cholecystectomy  Gastric bypass  Hysterectomy with bilateral salpingo-oophorectomy   Tubal ligation   Nunda teeth extraction  Dental extractions    Family History:    Mother: hypertension, seizures, depression, kidney stones  Father: hypertension, kidney disease, liver disease   Brother: sickle cell anemia, hypertension   Daughter:  "diabetes    Social History:  The patient presents to the ED alone.  The patient presents to the ED via car.    Physical Exam     Patient Vitals for the past 24 hrs:   BP Temp Temp src Pulse Resp SpO2 Height Weight   03/04/22 0036 (!) 132/91 98.6  F (37  C) -- 78 18 99 % -- --   03/03/22 2222 (!) 135/90 97.5  F (36.4  C) Temporal 110 16 97 % 1.651 m (5' 5\") 92.5 kg (204 lb)     Physical Exam  General: Resting on the gurney, appears mildly uncomfortable  Head:  The scalp, face, and head appear normal  Mouth/Throat: Mucus membranes are moist  CV:  Regular rate    Normal S1 and S2  No pathological murmur   Resp:  Breath sounds clear and equal bilaterally    Non-labored, no retractions or accessory muscle use    No coarseness    No wheezing   GI:  Mild right upper quadrant tenderness to palpation.  MS:  Normal motor assessment of all extremities.    Good capillary refill noted.    Skin:  Small area of firmness to the left upper quadrant underlying her incision. This is not indurated and feels most consistent with a small seroma.   Neuro:   Speech is normal and fluent. No apparent deficit.  Psych: Awake. Alert.  Normal affect.      Appropriate interactions.     Emergency Department Course     Imaging:  CT Abdomen Pelvis w Contrast   Final Result   IMPRESSION:    1.  Expected appearance of the abdomen status post recent cholecystectomy.   2.  No evidence for abdominal wall abscess.      Report per radiology    Laboratory:  Labs Ordered and Resulted from Time of ED Arrival to Time of ED Departure   ROUTINE UA WITH MICROSCOPIC REFLEX TO CULTURE - Abnormal       Result Value    Color Urine Light Yellow      Appearance Urine Clear      Glucose Urine Negative      Bilirubin Urine Negative      Ketones Urine Trace (*)     Specific Gravity Urine 1.019      Blood Urine Negative      pH Urine 6.0      Protein Albumin Urine Negative      Urobilinogen Urine Normal      Nitrite Urine Negative      Leukocyte Esterase Urine Negative   "    Mucus Urine Present (*)     RBC Urine <1      WBC Urine <1      Squamous Epithelials Urine 1     COMPREHENSIVE METABOLIC PANEL - Abnormal    Sodium 139      Potassium 3.2 (*)     Chloride 105      Carbon Dioxide (CO2) 29      Anion Gap 5      Urea Nitrogen 7      Creatinine 0.51 (*)     Calcium 8.2 (*)     Glucose 86      Alkaline Phosphatase 89      AST 14      ALT 17      Protein Total 6.5 (*)     Albumin 2.8 (*)     Bilirubin Total 0.2      GFR Estimate >90     LIPASE - Abnormal    Lipase 40 (*)    CBC WITH PLATELETS AND DIFFERENTIAL - Abnormal    WBC Count 4.2      RBC Count 3.67 (*)     Hemoglobin 11.5 (*)     Hematocrit 34.6 (*)     MCV 94      MCH 31.3      MCHC 33.2      RDW 13.7      Platelet Count 327      % Neutrophils 48      % Lymphocytes 37      % Monocytes 8      % Eosinophils 4      % Basophils 1      % Immature Granulocytes 2      NRBCs per 100 WBC 1 (*)     Absolute Neutrophils 2.1      Absolute Lymphocytes 1.6      Absolute Monocytes 0.4      Absolute Eosinophils 0.2      Absolute Basophils 0.0      Absolute Immature Granulocytes 0.1      Absolute NRBCs 0.0        Emergency Department Course:       Reviewed:  I reviewed nursing notes, vitals, past medical history, Care Everywhere    Assessments:  2312 I obtained history and examined the patient as noted above.   0030 I rechecked the patient and explained findings.     Disposition:  The patient was discharged to home.     Impression & Plan     Medical Decision Making:  Bernarda Leblanc is a 46 year old female who presents to the emergency department for evaluation of RUQ post operative pain and a small area of firmness in the left upper quadrant.  She contacted her surgical team several days ago complaining of pain and was recommended o go to the ED but did not have transportation.  Today a CT and labs were obtained and reassuring.  Pt minimally hypokalemic, pt requested rx for potassium, instead offered single dose and dietary education,  which she told nursing she did not want.  Recommended she follow up with her surgical team for ongoing post operative care and return promptly for fevers.    Diagnosis:    ICD-10-CM    1. Postoperative pain  G89.18      Scribe Disclosure:  I, Dari Reyes, am serving as a scribe at 11:12 PM on 3/3/2022 to document services personally performed by Saadia Colunga MD based on my observations and the provider's statements to me.        Saadia Colunga MD  03/04/22 0507

## 2022-03-04 NOTE — ED TRIAGE NOTES
Gall bladder removed last Thursday, now has pain in incision site. Pt. States she has not had any pain meds for 2 days.

## 2022-03-04 NOTE — TELEPHONE ENCOUNTER
Patient had a lap jac 2/24/22 DA and is asking for a refill of Dilaudid would like 10 mg instead of 2mg.    Also has some other various other pain and lab questions    Pharmacy is [facility]    Phone: [phone]  Message ok

## 2022-03-04 NOTE — TELEPHONE ENCOUNTER
Surgery:    46-year-old female status post recent robotic cholecystectomy.  The patient was having some abdominal pain postoperatively and was seen in the ER last night.  CT scan and lab work were unremarkable.  I reviewed these results with the patient today over the phone.  I have recommended she follow-up with her primary care provider to discuss her ongoing hypokalemia.  I have recommended the patient continue with as needed Tylenol for pain control.  She has requested a return to work letter for Monday.  My office will be in contact to help facilitate this.    Lisa Olivas MD

## 2022-03-05 ENCOUNTER — TELEPHONE (OUTPATIENT)
Dept: SURGERY | Facility: CLINIC | Age: 47
End: 2022-03-05
Payer: COMMERCIAL

## 2022-03-05 NOTE — TELEPHONE ENCOUNTER
"Surgery:    46-year-old female status post recent robotic cholecystectomy.  The patient calls in today due to continued abdominal pain.  She is having difficulty sleeping at night.  She is also concerned about the sutures \" bursting\" at one of her surgical sites.  She reports that she has to take shallow breaths due to her right-sided abdominal pain.  I discussed with the patient that our only option for evaluation over the weekend would be for her to present to the emergency department.  She is not interested in this as she was recently seen in the emergency department.  I offered her a clinic appointment on Monday.  She tells me that she has to return to work on Monday and would prefer to be seen in a different day.  I will plan to see the patient in my clinic on Wednesday morning at 930.  In the interim, I have instructed her to continue with Tylenol for pain control.  The patient is in agreement with this plan.  She was instructed to contact us again if she is noticing worsening symptoms.    Lisa Olivas MD    "

## 2022-03-09 ENCOUNTER — OFFICE VISIT (OUTPATIENT)
Dept: SURGERY | Facility: CLINIC | Age: 47
End: 2022-03-09
Payer: COMMERCIAL

## 2022-03-09 DIAGNOSIS — G89.29 OTHER CHRONIC BACK PAIN: ICD-10-CM

## 2022-03-09 DIAGNOSIS — M54.89 OTHER CHRONIC BACK PAIN: ICD-10-CM

## 2022-03-09 DIAGNOSIS — Z09 SURGERY FOLLOW-UP EXAMINATION: Primary | ICD-10-CM

## 2022-03-09 PROCEDURE — 99024 POSTOP FOLLOW-UP VISIT: CPT | Performed by: SURGERY

## 2022-03-09 NOTE — PROGRESS NOTES
Surgery Postoperative Note    S: Bernarda is a 46-year-old female who recently underwent robotic cholecystectomy on 2/24/2022.  The patient remained stable postoperatively.  She is tolerating oral intake.  She has returned to work.  She continues to complain of pain at her left upper quadrant port site, which was her specimen extraction site.  She is also having persistent right sided back pain.  Patient does report that she previously underwent MRI of her back to evaluate this pain.  CT scan and LFTs performed in the emergency department on 3/3/2022 are unremarkable.  The patient reports that she has been taking large quantities of ibuprofen.    Abdomen: Soft, nondistended, mildly tender surrounding surgical sites, incisions healing well without evidence of infection    Pathology:   Final Diagnosis   Gallbladder, resection-  Chronic cholecystitis with cholelithiasis, benign lymph node     Lab Results   Component Value Date    AST 14 03/03/2022     Lab Results   Component Value Date    ALT 17 03/03/2022     No results found for: BILICONJ   Lab Results   Component Value Date    BILITOTAL 0.2 03/03/2022     Lab Results   Component Value Date    ALBUMIN 2.8 03/03/2022     Lab Results   Component Value Date    PROTTOTAL 6.5 03/03/2022      Lab Results   Component Value Date    ALKPHOS 89 03/03/2022     CT ABDOMEN PELVIS W CONTRAST  LOCATION: [facility]  DATE/TIME: 3/3/2022 11:51 PM     FINDINGS:   LOWER CHEST: Normal.     HEPATOBILIARY: Interval cholecystectomy. Expected mild postoperative edema within the gallbladder fossa but no fluid collection. Mild hepatomegaly. No suspicious liver lesion.     PANCREAS: Normal.     SPLEEN: Normal.     ADRENAL GLANDS: Normal.     KIDNEYS/BLADDER: Normal.     BOWEL: No free air, free fluid, or inflammatory change. No evidence for bowel obstruction. Prior Sanjuanita-en-Y gastric bypass. Normal appendix.     LYMPH NODES: Normal.     VASCULATURE:  Unremarkable.     PELVIC ORGANS: Normal.     MUSCULOSKELETAL: Mild subcutaneous edema across the anterior abdominal wall. More localized edema within the left upper quadrant consistent with provided history, however, no significant fluid collection or evidence for abscess.                                                                      IMPRESSION:   1.  Expected appearance of the abdomen status post recent cholecystectomy.  2.  No evidence for abdominal wall abscess.    A/P  Bernarda Leblanc is recovering from a robotic cholecystectomy. I advised her to slowly return to regular activity. I expect she will make a complete recovery without issues.  I instructed the patient to stop taking ibuprofen, as she has previously undergone gastric bypass surgery and ibuprofen use increases her risk of marginal ulcer.  She was instructed to take Tylenol as needed for pain.  I did provide the patient with a referral to physical therapy to address her persistent right-sided back pain.  She may follow-up in the general surgery clinic on an as-needed basis.    Thank you for the opportunity to help in her care.    Lisa Olivas MD   [facility]  [phone]    Please route or send letter to:  Primary Care Provider (PCP) and Referring Provider

## 2022-03-09 NOTE — LETTER
March 9, 2022        [facility]      RE:   Bernarda Leblanc 1975      Dear Colleague,    Thank you for referring your patient, Bernarda Leblanc, to [facility] at [facility]. Please see a copy of my visit note below.     Bernarda is a 46-year-old female who recently underwent robotic cholecystectomy on 2/24/2022.  The patient remained stable postoperatively.  She is tolerating oral intake.  She has returned to work.  She continues to complain of pain at her left upper quadrant port site, which was her specimen extraction site.  She is also having persistent right sided back pain.  Patient does report that she previously underwent MRI of her back to evaluate this pain.  CT scan and LFTs performed in the emergency department on 3/3/2022 are unremarkable.  The patient reports that she has been taking large quantities of ibuprofen.     Abdomen: Soft, nondistended, mildly tender surrounding surgical sites, incisions healing well without evidence of infection     Pathology:   Final Diagnosis   Gallbladder, resection-  Chronic cholecystitis with cholelithiasis, benign lymph node            Lab Results   Component Value Date     AST 14 03/03/2022            Lab Results   Component Value Date     ALT 17 03/03/2022      No results found for: BILICONJ         Lab Results   Component Value Date     BILITOTAL 0.2 03/03/2022            Lab Results   Component Value Date     ALBUMIN 2.8 03/03/2022            Lab Results   Component Value Date     PROTTOTAL 6.5 03/03/2022            Lab Results   Component Value Date     ALKPHOS 89 03/03/2022      CT ABDOMEN PELVIS W CONTRAST  LOCATION: [facility]  DATE/TIME: 3/3/2022 11:51 PM     FINDINGS:   LOWER CHEST: Normal.     HEPATOBILIARY: Interval cholecystectomy. Expected mild postoperative edema within the gallbladder fossa but no fluid collection. Mild hepatomegaly. No suspicious liver lesion.     PANCREAS:  Normal.     SPLEEN: Normal.     ADRENAL GLANDS: Normal.     KIDNEYS/BLADDER: Normal.     BOWEL: No free air, free fluid, or inflammatory change. No evidence for bowel obstruction. Prior Sanjuanita-en-Y gastric bypass. Normal appendix.     LYMPH NODES: Normal.     VASCULATURE: Unremarkable.     PELVIC ORGANS: Normal.     MUSCULOSKELETAL: Mild subcutaneous edema across the anterior abdominal wall. More localized edema within the left upper quadrant consistent with provided history, however, no significant fluid collection or evidence for abscess.                                                                      IMPRESSION:   1.  Expected appearance of the abdomen status post recent cholecystectomy.  2.  No evidence for abdominal wall abscess.     A/P  Bernarda Leblanc is recovering from a robotic cholecystectomy. I advised her to slowly return to regular activity. I expect she will make a complete recovery without issues.  I instructed the patient to stop taking ibuprofen, as she has previously undergone gastric bypass surgery and ibuprofen use increases her risk of marginal ulcer.  She was instructed to take Tylenol as needed for pain.  I did provide the patient with a referral to physical therapy to address her persistent right-sided back pain.  She may follow-up in the general surgery clinic on an as-needed basis.             Again, thank you for allowing me to participate in the care of your patient.      Sincerely,      Lisa Olivas MD

## 2022-03-12 ENCOUNTER — HEALTH MAINTENANCE LETTER (OUTPATIENT)
Age: 47
End: 2022-03-12

## 2022-03-22 ENCOUNTER — TELEPHONE (OUTPATIENT)
Dept: SURGERY | Facility: CLINIC | Age: 47
End: 2022-03-22
Payer: COMMERCIAL

## 2022-03-22 NOTE — TELEPHONE ENCOUNTER
Called patient back to discuss provider recommendations.  Per Dr. Olivas:    Burning you could definitely be acid reflux.  If not improving with OTC PPI, recommend patient follow-up with primary provider.  Patient's incisional pain is related to her fascial closure.  This should continue to slowly improve.      Patient verbalized understanding of plan and she will call PRN    Kellie Kebede RN-BSN

## 2022-03-22 NOTE — TELEPHONE ENCOUNTER
"Procedure:  Robot assisted cholecystectomy without cholangiogram    Date: 02/24/2022    Surgeon: Brendon    Patient reporting ongoing issues with incision pain at the largest port site.  Pain with movement    She also complains of burning pain in her \"tummy.\"  She reports this is not constant. Comes and goes and does not seem to be related to eating. She reports it feels like \"nervousness and hunger pains\"    She also reports nausea with these burning incidents    Having bowel movements.  No emesis.    Informed patient that incision pain can take 6 - 8 weeks to resolve. But, should not be getting worse.    Burning could be more acid reflux in nature. Recommend she start taking an OTC antacid like prilosec to see if this helps    Will also discuss with Dr. Olivas and call patient back with further recommendations    Pt in agreement and will wait for call back.    She is leaving work and going home to rest. Requesting call back at number: [phone]    Kellie Kebede RN-BSN        "

## 2022-03-22 NOTE — TELEPHONE ENCOUNTER
Name of caller: Patient    Reason for Call:  Symptoms  Patient is still having a lot of pain and burning feeling in stomach and incision areas.    Surgeon:  Brendon    Recent Surgery:  Yes.    If yes, when & what type:  2/24/22    Cholecystectomy      Best phone number to reach pt at is: [phone]    Ok to leave a message with medical info? Yes.

## 2022-03-22 NOTE — LETTER
March 23, 2022     To Whom it May Concern:     Bernarda Leblanc is under the care of [facility] for recent surgery.     She is cleared to return to work on Monday, March 7, 2022.     She needs to remain on a 20 lb weight restriction until March 14, 2022.    She is not on any physical restrictions starting March 15th, 2022, but it can take up to 8 weeks to fully recover from this procedure.     Please contact [facility] with any questions or concerns.        Sincerely,           Kellie Kebede RN-BSN; QUINN Unger MD

## 2022-03-23 NOTE — TELEPHONE ENCOUNTER
Patient called back and would like to speak with  about possibly getting an antibiotic.      [phone]  OK to leave VM

## 2022-03-23 NOTE — TELEPHONE ENCOUNTER
"Procedure:  Robot assisted cholecystectomy without cholangiogram     Date: 02/24/2022     Surgeon: Brendon      Called pt re: wanted abx  Pt reports she is looking for a referral to a GI specialist that  recommended and to be started on an abx because she was reading about it \"online\".  Discussed with pt that  recommended starting OTC PPI and if not improving follow-up with PCP. Pt says she started pepcid and tums last night and this morning with no improvement. Advised pt follow-up with PCP today. They would be able to provider GI referral if necessary.    Pt asked if she has have a letter written that writes her pain can take 6-8 weeks to resolve for work. She says she will still go into work.     Pt reports she still has incision pain when she is bending, dressing, tieing shoes. Asked pt about using tylenol/ice/heat. Pt is taking tylenol but it is not helping. She has been using heat but it is not helping. Discussed starting to use ice to incision site.    Pt verbalized understanding. No further questions per pt.    Letter submitted to [employer]. Pt agrees  works for letter.    Edilma Marcum RN on 3/23/2022 at 12:50 PM      "

## 2022-09-23 ENCOUNTER — APPOINTMENT (OUTPATIENT)
Dept: GENERAL RADIOLOGY | Facility: CLINIC | Age: 47
End: 2022-09-23
Attending: EMERGENCY MEDICINE
Payer: COMMERCIAL

## 2022-09-23 ENCOUNTER — HOSPITAL ENCOUNTER (EMERGENCY)
Facility: CLINIC | Age: 47
Discharge: HOME | End: 2022-09-23
Attending: PHYSICIAN ASSISTANT | Admitting: PHYSICIAN ASSISTANT
Payer: COMMERCIAL

## 2022-09-23 VITALS
HEIGHT: 65 IN | HEART RATE: 70 BPM | DIASTOLIC BLOOD PRESSURE: 75 MMHG | BODY MASS INDEX: 32.32 KG/M2 | TEMPERATURE: 98.1 F | OXYGEN SATURATION: 94 % | WEIGHT: 194 LBS | RESPIRATION RATE: 22 BRPM | SYSTOLIC BLOOD PRESSURE: 154 MMHG

## 2022-09-23 DIAGNOSIS — M54.9 BACK PAIN, UNSPECIFIED BACK LOCATION, UNSPECIFIED BACK PAIN LATERALITY, UNSPECIFIED CHRONICITY: ICD-10-CM

## 2022-09-23 DIAGNOSIS — R07.9 CHEST PAIN, UNSPECIFIED TYPE: ICD-10-CM

## 2022-09-23 LAB
ALBUMIN SERPL-MCNC: 3.5 G/DL (ref 3.4–5)
ALP SERPL-CCNC: 146 U/L (ref 40–150)
ALT SERPL W P-5'-P-CCNC: 44 U/L (ref 0–50)
ANION GAP SERPL CALCULATED.3IONS-SCNC: 11 MMOL/L (ref 3–14)
AST SERPL W P-5'-P-CCNC: 29 U/L (ref 0–45)
ATRIAL RATE - MUSE: 92 BPM
BASOPHILS # BLD AUTO: 0 10E3/UL (ref 0–0.2)
BASOPHILS NFR BLD AUTO: 0 %
BILIRUB SERPL-MCNC: 0.3 MG/DL (ref 0.2–1.3)
BUN SERPL-MCNC: 4 MG/DL (ref 7–30)
CALCIUM SERPL-MCNC: 8.9 MG/DL (ref 8.5–10.1)
CHLORIDE BLD-SCNC: 103 MMOL/L (ref 94–109)
CO2 SERPL-SCNC: 25 MMOL/L (ref 20–32)
CREAT SERPL-MCNC: 0.5 MG/DL (ref 0.52–1.04)
D DIMER PPP FEU-MCNC: 0.34 UG/ML FEU (ref 0–0.5)
DIASTOLIC BLOOD PRESSURE - MUSE: NORMAL MMHG
EOSINOPHIL # BLD AUTO: 0 10E3/UL (ref 0–0.7)
EOSINOPHIL NFR BLD AUTO: 1 %
ERYTHROCYTE [DISTWIDTH] IN BLOOD BY AUTOMATED COUNT: 13.4 % (ref 10–15)
GFR SERPL CREATININE-BSD FRML MDRD: >90 ML/MIN/1.73M2
GLUCOSE BLD-MCNC: 83 MG/DL (ref 70–99)
HCT VFR BLD AUTO: 37.3 % (ref 35–47)
HGB BLD-MCNC: 12.7 G/DL (ref 11.7–15.7)
IMM GRANULOCYTES # BLD: 0.1 10E3/UL
IMM GRANULOCYTES NFR BLD: 1 %
INTERPRETATION ECG - MUSE: NORMAL
LYMPHOCYTES # BLD AUTO: 1.5 10E3/UL (ref 0.8–5.3)
LYMPHOCYTES NFR BLD AUTO: 28 %
MCH RBC QN AUTO: 31.5 PG (ref 26.5–33)
MCHC RBC AUTO-ENTMCNC: 34 G/DL (ref 31.5–36.5)
MCV RBC AUTO: 93 FL (ref 78–100)
MONOCYTES # BLD AUTO: 0.4 10E3/UL (ref 0–1.3)
MONOCYTES NFR BLD AUTO: 8 %
NEUTROPHILS # BLD AUTO: 3.2 10E3/UL (ref 1.6–8.3)
NEUTROPHILS NFR BLD AUTO: 62 %
NRBC # BLD AUTO: 0 10E3/UL
NRBC BLD AUTO-RTO: 0 /100
P AXIS - MUSE: 55 DEGREES
PLATELET # BLD AUTO: 320 10E3/UL (ref 150–450)
POTASSIUM BLD-SCNC: 3.5 MMOL/L (ref 3.4–5.3)
PR INTERVAL - MUSE: 138 MS
PROT SERPL-MCNC: 6.9 G/DL (ref 6.8–8.8)
QRS DURATION - MUSE: 84 MS
QT - MUSE: 384 MS
QTC - MUSE: 474 MS
R AXIS - MUSE: -13 DEGREES
RBC # BLD AUTO: 4.03 10E6/UL (ref 3.8–5.2)
SARS-COV-2 RNA RESP QL NAA+PROBE: NEGATIVE
SODIUM SERPL-SCNC: 139 MMOL/L (ref 133–144)
SYSTOLIC BLOOD PRESSURE - MUSE: NORMAL MMHG
T AXIS - MUSE: 22 DEGREES
TROPONIN I SERPL HS-MCNC: 4 NG/L
VENTRICULAR RATE- MUSE: 92 BPM
WBC # BLD AUTO: 5.1 10E3/UL (ref 4–11)

## 2022-09-23 PROCEDURE — 72040 X-RAY EXAM NECK SPINE 2-3 VW: CPT

## 2022-09-23 PROCEDURE — 36415 COLL VENOUS BLD VENIPUNCTURE: CPT | Performed by: EMERGENCY MEDICINE

## 2022-09-23 PROCEDURE — 85004 AUTOMATED DIFF WBC COUNT: CPT | Performed by: EMERGENCY MEDICINE

## 2022-09-23 PROCEDURE — 84484 ASSAY OF TROPONIN QUANT: CPT | Performed by: EMERGENCY MEDICINE

## 2022-09-23 PROCEDURE — U0003 INFECTIOUS AGENT DETECTION BY NUCLEIC ACID (DNA OR RNA); SEVERE ACUTE RESPIRATORY SYNDROME CORONAVIRUS 2 (SARS-COV-2) (CORONAVIRUS DISEASE [COVID-19]), AMPLIFIED PROBE TECHNIQUE, MAKING USE OF HIGH THROUGHPUT TECHNOLOGIES AS DESCRIBED BY CMS-2020-01-R: HCPCS | Performed by: PHYSICIAN ASSISTANT

## 2022-09-23 PROCEDURE — 99285 EMERGENCY DEPT VISIT HI MDM: CPT | Mod: 25

## 2022-09-23 PROCEDURE — 71046 X-RAY EXAM CHEST 2 VIEWS: CPT

## 2022-09-23 PROCEDURE — 85379 FIBRIN DEGRADATION QUANT: CPT | Performed by: EMERGENCY MEDICINE

## 2022-09-23 PROCEDURE — U0005 INFEC AGEN DETEC AMPLI PROBE: HCPCS | Performed by: EMERGENCY MEDICINE

## 2022-09-23 PROCEDURE — C9803 HOPD COVID-19 SPEC COLLECT: HCPCS

## 2022-09-23 PROCEDURE — 80053 COMPREHEN METABOLIC PANEL: CPT | Performed by: EMERGENCY MEDICINE

## 2022-09-23 PROCEDURE — 93005 ELECTROCARDIOGRAM TRACING: CPT

## 2022-09-23 PROCEDURE — 250N000013 HC RX MED GY IP 250 OP 250 PS 637: Performed by: PHYSICIAN ASSISTANT

## 2022-09-23 RX ORDER — ACETAMINOPHEN 500 MG
1000 TABLET ORAL EVERY 6 HOURS PRN
Qty: 30 TABLET | Refills: 0 | Status: SHIPPED | OUTPATIENT
Start: 2022-09-23

## 2022-09-23 RX ORDER — OXYCODONE HYDROCHLORIDE 5 MG/1
5 TABLET ORAL ONCE
Status: COMPLETED | OUTPATIENT
Start: 2022-09-23 | End: 2022-09-23

## 2022-09-23 RX ORDER — ACETAMINOPHEN 500 MG
1000 TABLET ORAL ONCE
Status: COMPLETED | OUTPATIENT
Start: 2022-09-23 | End: 2022-09-23

## 2022-09-23 RX ADMIN — OXYCODONE HYDROCHLORIDE 5 MG: 5 TABLET ORAL at 18:53

## 2022-09-23 RX ADMIN — ACETAMINOPHEN 1000 MG: 500 TABLET, FILM COATED ORAL at 17:40

## 2022-09-23 ASSESSMENT — ENCOUNTER SYMPTOMS
HEADACHES: 1
ABDOMINAL PAIN: 0
BACK PAIN: 1

## 2022-09-23 ASSESSMENT — ACTIVITIES OF DAILY LIVING (ADL): ADLS_ACUITY_SCORE: 35
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
## 2022-09-23 NOTE — LETTER
September 23, 2022      To Whom It May Concern:      Bernarda Leblanc was seen in our Emergency Department today, 09/23/22.  I expect her condition to improve over the next 4 days.  She may return to work after meeting with physical therapy, which a referral has been placed    Sincerely,        Oswaldo Mueller RN
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
## 2022-09-24 NOTE — DISCHARGE INSTRUCTIONS
Discharge Instructions  Chest Pain    You have been seen today for chest pain or discomfort.  At this time, your provider has found no signs that your chest pain is due to a serious or life-threatening condition, (or you have declined more testing and/or admission to the hospital). However, sometimes there is a serious problem that does not show up right away. Your evaluation today may not be complete and you may need further testing and evaluation.     Generally, every Emergency Department visit should have a follow-up clinic visit with either a primary or a specialty clinic/provider. Please follow-up as instructed by your emergency provider today.  Return to the Emergency Department if:  Your chest pain changes, gets worse, starts to happen more often, or comes with less activity.  You are newly short of breath.  You get very weak or tired.  You pass out or faint.  You have any new symptoms, like fever, cough, numb legs, or you cough up blood.  You have anything else that worries you.    Until you follow-up with your regular provider, please do the following:  Take one aspirin daily unless you have an allergy or are told not to by your provider.  If a stress test appointment has been made, go to the appointment.  If you have questions, contact your regular provider.  Follow-up with your regular provider/clinic as directed; this is very important.    If you were given a prescription for medicine here today, be sure to read all of the information (including the package insert) that comes with your prescription.  This will include important information about the medicine, its side effects, and any warnings that you need to know about.  The pharmacist who fills the prescription can provide more information and answer questions you may have about the medicine.  If you have questions or concerns that the pharmacist cannot address, please call or return to the Emergency Department.       Remember that you can always come  back to the Emergency Department if you are not able to see your regular provider in the amount of time listed above, if you get any new symptoms, or if there is anything that worries you.  Discharge Instructions  Back Pain  You were seen today for back pain. Back pain can have many causes, but most will get better without surgery or other specific treatment. Sometimes there is a herniated ( slipped ) disc. We do not usually do MRI scans to look for these right away, since most herniated discs will get better on their own with time.  Today, we did not find any evidence that your back pain was caused by a serious condition. However, sometimes symptoms develop over time and cannot be found during an emergency visit, so it is very important that you follow up with your primary provider.  Generally, every Emergency Department visit should have a follow-up clinic visit with either a primary or a specialty clinic/provider. Please follow-up as instructed by your emergency provider today.    Return to the Emergency Department if:  You develop a fever with your back pain.   You have weakness or change in sensation in one or both legs.  You lose control of your bowels or bladder, or cannot empty your bladder (cannot pee).  Your pain gets much worse.     Follow-up with your provider:  Unless your pain has completely gone away, please make an appointment with your provider within one week. Most of the routine care for back pain is available in a clinic and not the Emergency Department. You may need further management of your back pain, such as more pain medication, imaging such as an X-ray or MRI, or physical therapy.    What can I do to help myself?  Remain Active -- People are often afraid that they will hurt their back further or delay recovery by remaining active, but this is one of the best things you can do for your back. In fact, staying in bed for a long time to rest is not recommended. Studies have shown that people  with low back pain recover faster when they remain active. Movement helps to bring blood flow to the muscles and relieve muscle spasms as well as preventing loss of muscle strength.  Heat -- Using a heating pad can help with low back pain during the first few weeks. Do not sleep with a heating pad, as you can be burned.   Pain medications - You may take a pain medication such as Tylenol  (acetaminophen), Advil , Motrin  (ibuprofen) or Aleve  (naproxen).  If you were given a prescription for medicine here today, be sure to read all of the information (including the package insert) that comes with your prescription.  This will include important information about the medicine, its side effects, and any warnings that you need to know about.  The pharmacist who fills the prescription can provide more information and answer questions you may have about the medicine.  If you have questions or concerns that the pharmacist cannot address, please call or return to the Emergency Department.   Remember that you can always come back to the Emergency Department if you are not able to see your regular provider in the amount of time listed above, if you get any new symptoms, or if there is anything that worries you.

## 2022-12-07 ENCOUNTER — HOSPITAL ENCOUNTER (EMERGENCY)
Facility: CLINIC | Age: 47
Discharge: HOME | End: 2022-12-07
Attending: PHYSICIAN ASSISTANT | Admitting: PHYSICIAN ASSISTANT
Payer: COMMERCIAL

## 2022-12-07 VITALS
OXYGEN SATURATION: 97 % | DIASTOLIC BLOOD PRESSURE: 85 MMHG | HEART RATE: 94 BPM | SYSTOLIC BLOOD PRESSURE: 134 MMHG | TEMPERATURE: 98.2 F | RESPIRATION RATE: 18 BRPM

## 2022-12-07 DIAGNOSIS — T30.0 BURN: ICD-10-CM

## 2022-12-07 PROCEDURE — 90715 TDAP VACCINE 7 YRS/> IM: CPT | Performed by: PHYSICIAN ASSISTANT

## 2022-12-07 PROCEDURE — 16020 DRESS/DEBRID P-THICK BURN S: CPT

## 2022-12-07 PROCEDURE — 90471 IMMUNIZATION ADMIN: CPT | Performed by: PHYSICIAN ASSISTANT

## 2022-12-07 PROCEDURE — 250N000013 HC RX MED GY IP 250 OP 250 PS 637: Performed by: PHYSICIAN ASSISTANT

## 2022-12-07 PROCEDURE — 250N000011 HC RX IP 250 OP 636: Performed by: PHYSICIAN ASSISTANT

## 2022-12-07 PROCEDURE — 99284 EMERGENCY DEPT VISIT MOD MDM: CPT | Mod: 25

## 2022-12-07 RX ORDER — OXYCODONE HYDROCHLORIDE 5 MG/1
5 TABLET ORAL ONCE
Status: COMPLETED | OUTPATIENT
Start: 2022-12-07 | End: 2022-12-07

## 2022-12-07 RX ORDER — BACITRACIN ZINC 500 [USP'U]/G
OINTMENT TOPICAL 2 TIMES DAILY
Qty: 113 G | Refills: 0 | Status: SHIPPED | OUTPATIENT
Start: 2022-12-07

## 2022-12-07 RX ORDER — OXYCODONE HYDROCHLORIDE 5 MG/1
5-10 TABLET ORAL EVERY 6 HOURS PRN
Qty: 12 TABLET | Refills: 0 | Status: SHIPPED | OUTPATIENT
Start: 2022-12-07 | End: 2022-12-12

## 2022-12-07 RX ADMIN — CLOSTRIDIUM TETANI TOXOID ANTIGEN (FORMALDEHYDE INACTIVATED), CORYNEBACTERIUM DIPHTHERIAE TOXOID ANTIGEN (FORMALDEHYDE INACTIVATED), BORDETELLA PERTUSSIS TOXOID ANTIGEN (GLUTARALDEHYDE INACTIVATED), BORDETELLA PERTUSSIS FILAMENTOUS HEMAGGLUTININ ANTIGEN (FORMALDEHYDE INACTIVATED), BORDETELLA PERTUSSIS PERTACTIN ANTIGEN, AND BORDETELLA PERTUSSIS FIMBRIAE 2/3 ANTIGEN 0.5 ML: 5; 2; 2.5; 5; 3; 5 INJECTION, SUSPENSION INTRAMUSCULAR at 21:40

## 2022-12-07 RX ADMIN — OXYCODONE HYDROCHLORIDE 5 MG: 5 TABLET ORAL at 21:39

## 2022-12-07 ASSESSMENT — ENCOUNTER SYMPTOMS
NUMBNESS: 0
WEAKNESS: 0
WOUND: 1
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
## 2022-12-08 NOTE — ED PROVIDER NOTES
History     Chief Complaint:  Burn, Extremity     46 y/o female presents for evaluation of burn to left foot. This occurred this afternoon. She was waiting for a ride from [facility] but this did not arrive.  She arrives by EMS for eval.  She reports she accidentally spilled boiling hot water on her foot while cooking.  Pain immediate.  Denies N/T/W to her feet.  She is unsure her last TET (per [facility] 2012).     Denies Hx of DM  Not anticoagulated  HTN  Seizure disorder   On suboxone     Local resident  Works at a dental clinic  PCP established     The history is provided by the patient and medical records. No  was used.      ROS:  Review of Systems   Skin: Positive for wound.   Neurological: Negative for weakness and numbness.     Allergies:  No Known Allergies     Medications:    acetaminophen (TYLENOL) 500 MG tablet  amLODIPine (NORVASC) 5 MG tablet  buprenorphine HCl-naloxone HCl (SUBOXONE) 8-2 MG per film  buprenorphine HCl-naloxone HCl (SUBOXONE) 8-2 MG per film  DULoxetine (CYMBALTA) 60 MG capsule  HYDROmorphone (DILAUDID) 2 MG tablet  levETIRAcetam (KEPPRA) 500 MG tablet  lisinopril (PRINIVIL/ZESTRIL) 5 MG tablet  minoxidil (LONITEN) 2.5 MG tablet  multivitamin, therapeutic (THERA-VIT) TABS tablet  pregabalin (LYRICA) 100 MG capsule  pregabalin (LYRICA) 100 MG capsule  senna (SENOKOT) 8.6 MG tablet  zolpidem ER (AMBIEN CR) 12.5 MG CR tablet      Past Medical History:    Past Medical History:   Diagnosis Date     Asthma      Benign essential hypertension      Disorder of thyroid      Sleep apnea      Past Surgical History:    Past Surgical History:   Procedure Laterality Date     DAVINCI LAPAROSCOPIC CHOLECYSTECTOMY WITHOUT GRAMS N/A 2/24/2022    Procedure: CHOLECYSTECTOMY, ROBOT-ASSISTED, LAPAROSCOPIC, WITHOUT CHOLANGIOGRAM;  Surgeon: Lisa Olivas MD;  Location: [facility] OR     GASTRIC BYPASS      1998     GYN SURGERY      hysterectomy      Family History:    family history includes Diabetes in  her daughter; Hypertension in her father and mother.    Social History:   reports that she has been smoking. She has been smoking an average of .5 packs per day. She has never used smokeless tobacco. She reports that she does not drink alcohol and does not use drugs.  PCP: [facility], [facility]     Physical Exam     Patient Vitals for the past 24 hrs:   BP Temp Temp src Pulse Resp SpO2   12/07/22 2117 -- -- -- -- -- 97 %   12/07/22 2116 -- 98.2  F (36.8  C) Oral -- 18 --   12/07/22 2114 134/85 -- -- 94 -- --      Physical Exam  Vitals and nursing note reviewed.   Constitutional:       General: She is not in acute distress.     Appearance: Normal appearance. She is not ill-appearing, toxic-appearing or diaphoretic.   HENT:      Head: Normocephalic.      Right Ear: External ear normal.      Left Ear: External ear normal.      Mouth/Throat:      Comments: Mask in place, clear speech.   Eyes:      Conjunctiva/sclera: Conjunctivae normal.   Pulmonary:      Effort: Pulmonary effort is normal.   Musculoskeletal:         General: Tenderness present. Normal range of motion.      Cervical back: Normal range of motion and neck supple.      Comments: See pics:   Left foot: Blisters to medial aspect of foot/ankle/arch of foot. No blisters to sole of foot. No circumferential burn.  Ranging ankle with ease.  Wiggles toes. Significant edema.  Palpable DP pulse.  Brisk cap refill. Sensation grossly intact to light touch.     Right LE: Area of suspected first degree burn to medial mid calf.  No blisters. Ranging ankle with ease.  N/V intact to foot.     Pt has venous stasis changes to LE bilat.     Skin:     General: Skin is warm.      Capillary Refill: Capillary refill takes less than 2 seconds.      Findings: Erythema present.   Neurological:      General: No focal deficit present.      Mental Status: She is alert.   Psychiatric:         Mood and Affect: Mood normal.         Behavior: Behavior normal.         Thought  Content: Thought content normal.         Judgment: Judgment normal.                   Emergency Department Course     Emergency Department Course:     Reviewed:  I reviewed nursing notes, vitals and past medical history    Assessments:  : I obtained history and examined the patient as noted above.     Interventions:  Medications   Tdap (tetanus-diphtheria-acell pertussis) (ADACEL) injection 0.5 mL (0.5 mLs Intramuscular Given 22)   oxyCODONE (ROXICODONE) tablet 5 mg (5 mg Oral Given 22)      Disposition:  The patient was discharged to home.     Impression & Plan      Medical Decision Makin y/o female presents for evaluation of burn to left foot. This occurred this afternoon. She was waiting for a ride from [facility] but this did not arrive.  She arrives by EMS for eval.  She reports she accidentally spilled boiling hot water on her foot while cooking.  Pain immediate.  Denies N/T/W to her feet.  She is unsure her last TET (per [facility] ).     Exam as above.  2nd degree burn to medial aspect of foot/ankle, not circumferential and no burn to overt sole of foot (does have on medial arch).  Due to size of blister I elected to open/drain, serous fluid return. No concern for infection at this time. TET was updated.  Wounds were cleansed and dressed. She was provided post-op shoe and crutches to off load. She does  Appear to have small area of first degree burn to right mid calf and does have chronic venous statis changes to bilateral LEs.  Discussed importance of elevation of extremities. Pt educated on S/S of infection and the importance of wound care.  She was provided Rx for oxycodone as burn injury felt warranted/reasonable.  That said discussed our ED will not continue to refill and discussed importance of F/U with established PCP.  Discussed ideal to have wound check with PCP in the next 2 days. Pt educated on S/S that should prompt ED re-eval.  Questions answered. Verbalized understanding.  Comfortable with plan and appreciative.     Diagnosis:    ICD-10-CM    1. Burn  T30.0          Discharge Medications:  New Prescriptions    BACITRACIN 500 UNIT/GM EXTERNAL OINTMENT    Apply topically 2 times daily    OXYCODONE (ROXICODONE) 5 MG TABLET    Take 1-2 tablets (5-10 mg) by mouth every 6 hours as needed for pain      12/7/2022   Sujey Pennington PA-C [place], Leah M, PA-C  12/07/22 2225

## 2022-12-08 NOTE — ED NOTES
Bed: FT04  Expected date:   Expected time:   Means of arrival:   Comments:  HEMS 426 47F burns to lower legs

## 2022-12-08 NOTE — DISCHARGE INSTRUCTIONS
EXPLANATION:  The provider has evaluated your burn wounds.     HOME CARE:  Wash the wound daily with warm water and dial soap.  Pat dry.  Apply a thin layer of topical antibiotic ointment to the wound area (bacitracin, available over the counter).  Then apply new/clean bandage.  Keep your legs elevated above your heart whenever possible  Cool compresses in 20 minute intervals can also help with discomfort  The provider prescribed pain medication to use for severe pain.  Use this sparingly.  Our ED will likely not continue to provide refills of this medication for this issue due to the addictive properties. Important you follow-up with your primary care provider. When able, use tylenol for pain.     RETURN to the EMERGENCY DEPARTMENT if you develop any of the following:  Fever (temp 100.4 or higher)  Redness, swelling, or pus drainage from the wound  Trouble moving your legs/ankles/feet/toes due to pain, swelling, or weakness  Trouble breathing  Confusion  Vomiting    The examination and treatment you have received in the Emergency Department has been rendered on an emergency basis only and not intended to be a substitute for complete ongoing medical care.

## 2022-12-12 ENCOUNTER — HOSPITAL ENCOUNTER (EMERGENCY)
Facility: CLINIC | Age: 47
Discharge: HOME | End: 2022-12-12
Attending: EMERGENCY MEDICINE | Admitting: EMERGENCY MEDICINE
Payer: COMMERCIAL

## 2022-12-12 VITALS
SYSTOLIC BLOOD PRESSURE: 137 MMHG | HEART RATE: 112 BPM | WEIGHT: 197 LBS | OXYGEN SATURATION: 97 % | TEMPERATURE: 97.6 F | HEIGHT: 65 IN | DIASTOLIC BLOOD PRESSURE: 86 MMHG | RESPIRATION RATE: 20 BRPM | BODY MASS INDEX: 32.82 KG/M2

## 2022-12-12 DIAGNOSIS — T25.212D: ICD-10-CM

## 2022-12-12 PROCEDURE — 99283 EMERGENCY DEPT VISIT LOW MDM: CPT

## 2022-12-12 RX ORDER — OXYCODONE HYDROCHLORIDE 5 MG/1
5 TABLET ORAL EVERY 6 HOURS PRN
Qty: 10 TABLET | Refills: 0 | Status: SHIPPED | OUTPATIENT
Start: 2022-12-12

## 2022-12-12 ASSESSMENT — ENCOUNTER SYMPTOMS
FEVER: 0
JOINT SWELLING: 1
SHORTNESS OF BREATH: 0
CHILLS: 0

## 2022-12-12 NOTE — DISCHARGE INSTRUCTIONS
Keep your ankle elevated as much as possible.    Daily dressing changes after cleaning the wound.    Do not take Suboxone while you are on Oxycodone.    Take Tylenol 650 mg every 4-6 hours for pain.

## 2022-12-12 NOTE — LETTER
December 12, 2022      To Whom It May Concern:      Bernarda Leblanc was seen in our Emergency Department today, 12/12/22.  I expect her condition to improve over the next 4 days.  She may return to work  when improved.    Sincerely,        Amita Weathers MD

## 2022-12-12 NOTE — ED PROVIDER NOTES
History     Chief Complaint:  Left ankle pain    HPI   Bernarda Leblanc is a 47 year old female who presents for evaluation of left ankle pain.  This patient sustained a second-degree burn to her left ankle 5 days ago when hot water spilled on her ankle with persistent pain to the ankle.  She was seen here 5 days ago the same day the burn occurred and was prescribed oxycodone to take for the pain but she has been out of this.  She reports that she has not been taking her Suboxone for the past 5 days.  She denies fevers or chills, increasing swelling, numbness.  No other new complaints.  She also reports that she was told she would get crutches during the previous ED visit but did not received the crutches and the ankle was more painful with walking.    Review of Systems   Constitutional: Negative for chills and fever.   Respiratory: Negative for shortness of breath.    Cardiovascular: Positive for leg swelling. Negative for chest pain.   Musculoskeletal: Positive for joint swelling.   All other systems reviewed and are negative.      Allergies:  No Known Allergies     Medications:    oxyCODONE (ROXICODONE) 5 MG tablet  acetaminophen (TYLENOL) 500 MG tablet  amLODIPine (NORVASC) 5 MG tablet  bacitracin 500 UNIT/GM external ointment  buprenorphine HCl-naloxone HCl (SUBOXONE) 8-2 MG per film  buprenorphine HCl-naloxone HCl (SUBOXONE) 8-2 MG per film  DULoxetine (CYMBALTA) 60 MG capsule  HYDROmorphone (DILAUDID) 2 MG tablet  levETIRAcetam (KEPPRA) 500 MG tablet  lisinopril (PRINIVIL/ZESTRIL) 5 MG tablet  minoxidil (LONITEN) 2.5 MG tablet  multivitamin, therapeutic (THERA-VIT) TABS tablet  pregabalin (LYRICA) 100 MG capsule  pregabalin (LYRICA) 100 MG capsule  senna (SENOKOT) 8.6 MG tablet  zolpidem ER (AMBIEN CR) 12.5 MG CR tablet        Past Medical History:    Past Medical History:   Diagnosis Date     Asthma      Benign essential hypertension      Disorder of thyroid      Sleep apnea        Past Surgical  "History:    Past Surgical History:   Procedure Laterality Date     ONEIDAINCI LAPAROSCOPIC CHOLECYSTECTOMY WITHOUT GRAMS N/A 2/24/2022    Procedure: CHOLECYSTECTOMY, ROBOT-ASSISTED, LAPAROSCOPIC, WITHOUT CHOLANGIOGRAM;  Surgeon: Lisa Olivas MD;  Location: [facility] OR     GASTRIC BYPASS      1998     GYN SURGERY      hysterectomy        Family History:    family history includes Diabetes in her daughter; Hypertension in her father and mother.    Social History:  Presents alone    Physical Exam     Patient Vitals for the past 24 hrs:   BP Temp Pulse Resp SpO2 Height Weight   12/12/22 1301 137/86 97.6  F (36.4  C) 112 20 97 % 1.651 m (5' 5\") 89.4 kg (197 lb)        Physical Exam  Nursing note and vitals reviewed.  Constitutional:  Appears well-developed and well-nourished.   HENT:   Head:    Atraumatic.   Eyes:    Pupils are equal, round, and reactive to light.   Neck:    Normal range of motion. Neck supple.      No tracheal deviation present. No thyromegaly present.   Cardiovascular:  Normal rate, regular rhythm, no murmur   Pulmonary/Chest: Breath sounds are clear and equal without wheezes or crackles.  Abdominal:   Soft. Bowel sounds are normal. Exhibits no distension and      no mass. There is no tenderness.      There is no rebound and no guarding.   Musculoskeletal:  Left Leg:  Two second degree burn blisters with clear fluid overlying the medial aspect of the ankle without surrounding redness, warmth or swelling of the skin.  No purulence.  Sensation intact distally.  Good dorsalis pedis pulse.  Neurological:   Alert and oriented to person, place, and time.   Skin:    Skin is warm and dry. No rash noted. No pallor.       Emergency Department Course   No results found for this or any previous visit (from the past 24 hour(s)).          Emergency Department Course:    Reviewed:  I reviewed nursing notes, vitals, and past medical history    Assessments:   I obtained history and examined the patient as noted above. "     Crutches given    Disposition:  The patient was discharged to home.     Impression & Plan      Medical Decision Making:  This patient sustained second-degree burn to the left ankle 5 days ago with persistent pain.  I referred her to [place] [facility] and she was told to call the clinic today to schedule follow-up appointment in clinic for this week.  There was no sign of infection, cellulitis or necrotizing fasciitis.  No sign of neurovascular injury.  She was prescribed a few more oxycodone tablets and told not to use the Suboxone while she is on the oxycodone.  She was given crutches and told to keep the leg elevated is much as possible and given a work note for this week off of work.  She was told signs and symptoms to return for.  She was told to clean the wound daily and perform dressing change.    Diagnosis:    ICD-10-CM    1. Second degree burn of left ankle, subsequent encounter  T25.212D            Discharge Medications:  Discharge Medication List as of 12/12/2022  1:36 PM           12/12/2022   Amita Weathers MD [place], Cheri Lee, MD  12/12/22 1409

## 2022-12-16 ENCOUNTER — HOSPITAL ENCOUNTER (EMERGENCY)
Facility: CLINIC | Age: 47
Discharge: HOME | End: 2022-12-16
Payer: COMMERCIAL

## 2022-12-16 VITALS
SYSTOLIC BLOOD PRESSURE: 128 MMHG | HEIGHT: 65 IN | BODY MASS INDEX: 32.82 KG/M2 | RESPIRATION RATE: 18 BRPM | WEIGHT: 197 LBS | HEART RATE: 90 BPM | TEMPERATURE: 97.5 F | OXYGEN SATURATION: 98 % | DIASTOLIC BLOOD PRESSURE: 68 MMHG

## 2022-12-16 DIAGNOSIS — T25.222S: ICD-10-CM

## 2022-12-16 PROCEDURE — 250N000013 HC RX MED GY IP 250 OP 250 PS 637

## 2022-12-16 PROCEDURE — 99283 EMERGENCY DEPT VISIT LOW MDM: CPT

## 2022-12-16 RX ORDER — ACETAMINOPHEN 325 MG/1
650 TABLET ORAL ONCE
Status: COMPLETED | OUTPATIENT
Start: 2022-12-16 | End: 2022-12-16

## 2022-12-16 RX ADMIN — ACETAMINOPHEN 650 MG: 325 TABLET, FILM COATED ORAL at 21:46

## 2022-12-16 ASSESSMENT — ACTIVITIES OF DAILY LIVING (ADL): ADLS_ACUITY_SCORE: 33

## 2022-12-16 NOTE — LETTER
December 16, 2022      To Whom It May Concern:      Bernarda Leblanc was seen in our Emergency Department today, 12/16/22.  I expect her condition to continue to improve.  She may return to work without restrictions 12/17/22.    Sincerely,        Lisa Almodovar PA-C

## 2022-12-17 ASSESSMENT — ENCOUNTER SYMPTOMS
WOUND: 1
JOINT SWELLING: 0
FEVER: 0
CHILLS: 0

## 2022-12-17 NOTE — DISCHARGE INSTRUCTIONS
You incurred a second-degree burn 12/7/22 with boiling water.  This was rechecked again 12/12 and found not to have infection.  Today, there is no evidence for infection.  We applied more bacitracin antibiotic ointment and wrapped the wound and sterile dressing.  Please call the [facility] burn clinic per the number provided to make a follow-up appointment for close recheck.  Return if you develop a fever, if there is purulent drainage coming from the wound, if there is spreading redness or if you have other new or concerning problems.

## 2022-12-17 NOTE — ED TRIAGE NOTES
3rd ED visit after a 2nd degree burn to left ankle. Blisters bursted yesterday and are painful. Pt concerned for possible infection.     Triage Assessment     Row Name 12/16/22 2014       Triage Assessment (Adult)    Airway WDL WDL       Respiratory WDL    Respiratory WDL WDL       Skin Circulation/Temperature WDL    Skin Circulation/Temperature WDL X  bursted blister to left ankle       Cardiac WDL    Cardiac WDL WDL       Peripheral/Neurovascular WDL    Peripheral Neurovascular WDL WDL       Cognitive/Neuro/Behavioral WDL    Cognitive/Neuro/Behavioral WDL WDL

## 2022-12-17 NOTE — ED PROVIDER NOTES
History     Chief Complaint:  Wound Check      HPI   Bernarda Leblanc is a 47 year old female who presents to the ED for a recheck of the burn on her left foot.  The patient initially burned the medial aspect of her left foot 12/7/2022 when she spilled boiling water on it.  She was diagnosed with a second-degree burn, her tetanus was updated, the wounds were cleansed and dressed and bacitracin was applied.  She came back 12/12/2022 and was concerned for infection.  On chart review, there was no sign of infection, cellulitis or necrotizing fasciitis.  No neurovascular injury.  Patient was referred to [facility] burn clinic.  Patient did not follow-up with [facility] burn clinic.  She comes in [facility] concerned that she may have an infection.  She would like the wound rechecked.    Review of Systems   Constitutional: Negative for chills and fever.   Musculoskeletal: Negative for joint swelling.   Skin: Positive for wound.        Healing second-degree burn on left foot   All other systems reviewed and are negative.    Allergies:  Ibuprofen      Medications:    acetaminophen (TYLENOL) 500 MG tablet  amLODIPine (NORVASC) 5 MG tablet  bacitracin 500 UNIT/GM external ointment  buprenorphine HCl-naloxone HCl (SUBOXONE) 8-2 MG per film  buprenorphine HCl-naloxone HCl (SUBOXONE) 8-2 MG per film  DULoxetine (CYMBALTA) 60 MG capsule  HYDROmorphone (DILAUDID) 2 MG tablet  levETIRAcetam (KEPPRA) 500 MG tablet  lisinopril (PRINIVIL/ZESTRIL) 5 MG tablet  minoxidil (LONITEN) 2.5 MG tablet  multivitamin, therapeutic (THERA-VIT) TABS tablet  oxyCODONE (ROXICODONE) 5 MG tablet  pregabalin (LYRICA) 100 MG capsule  pregabalin (LYRICA) 100 MG capsule  senna (SENOKOT) 8.6 MG tablet  zolpidem ER (AMBIEN CR) 12.5 MG CR tablet        Past Medical History:    Past Medical History:   Diagnosis Date     Asthma      Benign essential hypertension      Disorder of thyroid      Sleep apnea      Patient Active Problem List    Diagnosis Date Noted     Biliary  "colic 02/23/2022     Priority: Medium     Gallstones 02/23/2022     Priority: Medium        Past Surgical History:    Past Surgical History:   Procedure Laterality Date     DAVINCI LAPAROSCOPIC CHOLECYSTECTOMY WITHOUT GRAMS N/A 2/24/2022    Procedure: CHOLECYSTECTOMY, ROBOT-ASSISTED, LAPAROSCOPIC, WITHOUT CHOLANGIOGRAM;  Surgeon: Lisa Olivas MD;  Location: [facility] OR     GASTRIC BYPASS      1998     GYN SURGERY      hysterectomy       Family History:    Family History   Problem Relation Age of Onset     Hypertension Mother      Hypertension Father      Diabetes Daughter        Social History:  Clinic, [facility]  The patient presents to the ED alone    Physical Exam     Patient Vitals for the past 24 hrs:   BP Temp Temp src Pulse Resp SpO2 Height Weight   12/16/22 2207 128/68 -- -- 90 18 98 % -- --   12/16/22 2008 123/71 97.5  F (36.4  C) Temporal 98 16 96 % 1.651 m (5' 5\") 89.4 kg (197 lb)       Physical Exam  General: Pleasant middle-aged female sitting in exam room.  HENT: Patient wearing face mask. When taken off, mucous membranes appear moist.  Eyes: Conjunctive and sclera clear.  EOMs intact.  CV: Regular rate and rhythm. Normal S1, S2. No appreciable murmurs, gallops or rubs.  Resp: Normal respiratory effort. Speaks in full sentences.   MSK: Moves all extremities without difficulty.   Skin: Patient has healing second-degree burn on medial aspect of left foot/ankle per photo below.  No surrounding erythema or warmth.  No tenderness surrounding the wound.  No purulent drainage.  Sensation fully intact distally.  Robust dorsalis pedis pulse.        Neuro: Awake, alert, oriented.  Psych: Cooperative. Normal affect.    Emergency Department Course       Emergency Department Course:  Reviewed:  I reviewed nursing notes, vitals and past medical history    Assessments:  9:20pm I obtained history and examined the patient as noted above. I took photos for chart per above.  9:37pm I dressed the wound and " discharged the patient home with referral to [facility].    Interventions:  Medications   acetaminophen (TYLENOL) tablet 650 mg (650 mg Oral Given 12/16/22 2146)     Disposition:  The patient was discharged to home.     Impression & Plan      Medical Decision Making:  Bernarda barnes 47-year-old female who came in to get the burn on her left foot rechecked per HPI above.  This was a second-degree burn initially incurred 12/7/2022.  Patient had been referred to [facility] burn clinic, but never followed up with this referral.  On arrival, vital signs were normal and she was afebrile.  Photos were captured per above of the healing burn.  There is no surrounding erythema or warmth to concern me for cellulitis.  No purulent drainage.  She was neurovascularly intact.  We cleaned the wound and applied bacitracin and dressed it and sterile dressings.  She was again supplied to the [facility] burn clinic information so she can call for an appointment for recheck.  She was reassured that the burn is slowly healing and that there was no evidence on today's recheck for any infection.  She was given Tylenol and provided with a work note that she asked for.  Understands to return here if she were to develop a fever, spreading redness, purulent drainage, new swelling or severe pain.    Diagnosis:    ICD-10-CM    1. Second degree burn of foot, left, sequela  T25.222S           Lisa Espinoza PA-C, PA-C  12/17/22 0018

## 2023-04-22 ENCOUNTER — HEALTH MAINTENANCE LETTER (OUTPATIENT)
Age: 48
End: 2023-04-22

## 2023-09-14 VITALS
HEART RATE: 95 BPM | OXYGEN SATURATION: 95 % | WEIGHT: 190 LBS | SYSTOLIC BLOOD PRESSURE: 124 MMHG | TEMPERATURE: 97.5 F | RESPIRATION RATE: 16 BRPM | DIASTOLIC BLOOD PRESSURE: 76 MMHG | BODY MASS INDEX: 31.62 KG/M2

## 2023-09-14 PROCEDURE — 99284 EMERGENCY DEPT VISIT MOD MDM: CPT

## 2023-09-15 ENCOUNTER — APPOINTMENT (OUTPATIENT)
Dept: GENERAL RADIOLOGY | Facility: CLINIC | Age: 48
End: 2023-09-15
Attending: EMERGENCY MEDICINE
Payer: COMMERCIAL

## 2023-09-15 ENCOUNTER — HOSPITAL ENCOUNTER (EMERGENCY)
Facility: CLINIC | Age: 48
Discharge: HOME | End: 2023-09-15
Attending: EMERGENCY MEDICINE | Admitting: EMERGENCY MEDICINE
Payer: COMMERCIAL

## 2023-09-15 DIAGNOSIS — G89.29 CHRONIC PAIN OF BOTH KNEES: ICD-10-CM

## 2023-09-15 DIAGNOSIS — M25.562 CHRONIC PAIN OF BOTH KNEES: ICD-10-CM

## 2023-09-15 DIAGNOSIS — M25.561 CHRONIC PAIN OF BOTH KNEES: ICD-10-CM

## 2023-09-15 PROCEDURE — 73564 X-RAY EXAM KNEE 4 OR MORE: CPT | Mod: 50

## 2023-09-15 PROCEDURE — 73030 X-RAY EXAM OF SHOULDER: CPT | Mod: LT

## 2023-09-15 ASSESSMENT — ACTIVITIES OF DAILY LIVING (ADL): ADLS_ACUITY_SCORE: 33

## 2023-09-15 NOTE — ED TRIAGE NOTES
Pt C/O of bilateral leg pain/knee pain. Pt states this started 4 days ago. Pt also mentions that she fell a week ago on her knee's chasing a dog. Pt states the L knee hurts the worse. No swelling or bruising noted. Pt has hx of neuropathy     Triage Assessment       Row Name 09/14/23 8023       Triage Assessment (Adult)    Airway WDL WDL       Respiratory WDL    Respiratory WDL WDL       Skin Circulation/Temperature WDL    Skin Circulation/Temperature WDL WDL       Cardiac WDL    Cardiac WDL WDL       Peripheral/Neurovascular WDL    Peripheral Neurovascular WDL WDL       Cognitive/Neuro/Behavioral WDL    Cognitive/Neuro/Behavioral WDL WDL

## 2023-09-15 NOTE — ED PROVIDER NOTES
History   Chief Complaint:  Knee Pain     The history is provided by the patient and medical records.      Bernarda Leblanc is a 48 year old female with history of hypertension who presents with knee pain. The patient states she has had bilateral knee pain for the past week. Left knee pain is worse than the right knee. She has had neck pain as well, however denies shooting pain down her arms and does note this is chronic for her. She notes she did sustain a fall and injured her right knee, but did not have immediate pain. She denies hip or ankle pain. She denies leg swelling. She was seen at urgent care yesterday. She has an appointment scheduled with orthopedics on 09/28. She has used gabapentin and Tylenol at home for symptom management. She endorses history of neuropathy.     Independent Historian:   None - Patient Only      Medications:    Norvasc  Suboxone   Cymbalta  Dilaudid   Keppra  Lisinopril  Loniten   Roxicodone  Lyrica   Ambien     Past Medical History:    Asthma  Hypertension  Thyroid disorder   Obstructive sleep apnea  Seizure   Substance abuse   Opioid dependence   Herniated lumbar disc   Migraines   Cervicalgia   Sickle cell trait    Past Surgical History:    Cholecystectomy  Gastric bypass  Hysterectomy   Tubal ligation     Physical Exam   Patient Vitals for the past 24 hrs:   BP Temp Temp src Pulse Resp SpO2 Weight   09/14/23 2100 124/76 97.5  F (36.4  C) Temporal 95 16 95 % 86.2 kg (190 lb)      Physical Exam  General: Resting comfortably on the gurney  Head:  The scalp, face, and head appear normal  Eyes:  The pupils are normal    Conjunctivae and sclera appear normal  ENT:    The nose is normal    Ears/pinnae are normal  Neck:  Normal range of motion  MS:  Left Knee:    The Quadriceps Tendon is intact    The Patella is in the midline and not clinically fractured    The Patella Tendon is intact    There is no significant joint effusion    The ACL and PCL are not clinically  ruptured    There is no MCL pain/tenderness    There is no LCL pain/tenderness    The MM is non-tender on examination    The LM is non-tender on examination    The pre-patellar bursae is without effusion    Right Knee:    The Quadriceps Tendon is intact    The Patella is in the midline and not clinically fractured    The Patella Tendon is intact    There is no significant joint effusion    The ACL and PCL are not clinically ruptured    There is no MCL pain/tenderness    There is no LCL pain/tenderness    The MM is non-tender on examination    The LM is non-tender on examination    The pre-patellar bursae is without effusion    Left Shoulder:    The Clavicle is intact clinically    The AC joint is non-tender    The Glenohumeral Joint is intact    No dislocation is palpated/appreciated clinically    There is no evidence of rotator cuff muscle/tendon disruption    The proximal humerus is non-tender    The mid and distal shaft of the humerus are non-tender    Axillary nerve function is intact    Brachial and Radial Artery pulse is normal    Median, Ulnar, and Radial Nerve function distally are intact  Skin:  No rash or lesions noted.  Neuro:  Speech is normal and fluent  Psych: Awake. Alert.  Normal affect.      Appropriate interactions    Emergency Department Course   Imaging:  XR Shoulder Left G/E 3 Views   Final Result   IMPRESSION: Degenerative change. No visible fracture or dislocation.      XR Knee Bilateral G/E 4 Views   Final Result   IMPRESSION:    RIGHT KNEE: Normal joint spaces and alignment. No fracture or joint effusion. Quadriceps tendon enthesopathy is present      LEFT KNEE: Normal joint spaces and alignment. No fracture or joint effusion. Quadriceps tendon enthesopathy is present      Report per radiology    Emergency Department Course & Assessments:     Interventions:  Medications - No data to display     Assessments:  0005 I obtained history and performed an exam of the patient as documented  above.  0153 I rechecked the patient and explained findings. Discussed plan of care.     Independent Interpretation (X-rays, CTs, rhythm strip):  I reviewed the knee and shoulder x-rays. No fractures or dislocations.     Consultations/Discussion of Management or Tests:  None     Social Determinants of Health affecting care:   Healthcare Access/Compliance    Disposition:  The patient was discharged to home.     Impression & Plan    Medical Decision Making:  This patient presents to the ED with bilateral knee pain. X rays were obtained and returned negative. The patient does not have a significant joint effusion. There is no apparent joint laxity. The patient's extensor mechanisms are intact. With reasonable clinical certainty I feel that the patient is safe for discharge home for ongoing evaluation and management as an outpatient. Supportive care discussed.  Follow-up with orthopedics if no improvement in the next 1-2 weeks.  We will trial a course of Voltaren external gel to see if this improves her symptoms.  Return to the ED with new or worsening symptoms including increased pain, numbness, weakness, fevers, etc.     Of note the patient also requested x-ray imaging of her left shoulder for which she has chronic issues with.  Thankfully no signs of dislocation or fracture associated with the left shoulder.  This should also be followed up with orthopedic surgery at soonest convenience.    Diagnosis:    ICD-10-CM    1. Chronic pain of both knees  M25.561     M25.562     G89.29          Discharge Medications:  Discharge Medication List as of 9/15/2023  1:54 AM        START taking these medications    Details   diclofenac (VOLTAREN) 1 % topical gel Apply 4 g topically 3 times daily as needed for moderate pain (apply to both knees as needed for ongoing pain.), Disp-150 g, R-1, E-Prescribe            Scribe Disclosure:  Kyleigh ROSARIO, am serving as a scribe at 12:04 AM on 9/15/2023 to document services personally  performed by Vidal Santiago MD based on my observations and the provider's statements to me.      Vidal Santiago MD  09/15/23 0410

## 2023-10-09 ENCOUNTER — APPOINTMENT (OUTPATIENT)
Dept: ULTRASOUND IMAGING | Facility: CLINIC | Age: 48
End: 2023-10-09
Attending: EMERGENCY MEDICINE
Payer: COMMERCIAL

## 2023-10-09 ENCOUNTER — HOSPITAL ENCOUNTER (EMERGENCY)
Facility: CLINIC | Age: 48
Discharge: HOME | End: 2023-10-09
Attending: EMERGENCY MEDICINE | Admitting: EMERGENCY MEDICINE
Payer: COMMERCIAL

## 2023-10-09 VITALS
DIASTOLIC BLOOD PRESSURE: 87 MMHG | OXYGEN SATURATION: 97 % | HEIGHT: 65 IN | WEIGHT: 190 LBS | SYSTOLIC BLOOD PRESSURE: 131 MMHG | BODY MASS INDEX: 31.65 KG/M2 | TEMPERATURE: 98 F | RESPIRATION RATE: 16 BRPM | HEART RATE: 107 BPM

## 2023-10-09 DIAGNOSIS — R60.0 PERIPHERAL EDEMA: ICD-10-CM

## 2023-10-09 DIAGNOSIS — G89.29 OTHER CHRONIC PAIN: ICD-10-CM

## 2023-10-09 LAB
ANION GAP SERPL CALCULATED.3IONS-SCNC: 10 MMOL/L (ref 7–15)
BASO+EOS+MONOS # BLD AUTO: ABNORMAL 10*3/UL
BASO+EOS+MONOS NFR BLD AUTO: ABNORMAL %
BASOPHILS # BLD AUTO: 0 10E3/UL (ref 0–0.2)
BASOPHILS NFR BLD AUTO: 1 %
BUN SERPL-MCNC: 4.8 MG/DL (ref 6–20)
CALCIUM SERPL-MCNC: 8.7 MG/DL (ref 8.6–10)
CHLORIDE SERPL-SCNC: 106 MMOL/L (ref 98–107)
CREAT SERPL-MCNC: 0.54 MG/DL (ref 0.51–0.95)
DEPRECATED HCO3 PLAS-SCNC: 26 MMOL/L (ref 22–29)
EGFRCR SERPLBLD CKD-EPI 2021: >90 ML/MIN/1.73M2
EOSINOPHIL # BLD AUTO: 0.1 10E3/UL (ref 0–0.7)
EOSINOPHIL NFR BLD AUTO: 5 %
ERYTHROCYTE [DISTWIDTH] IN BLOOD BY AUTOMATED COUNT: 13.6 % (ref 10–15)
GLUCOSE SERPL-MCNC: 89 MG/DL (ref 70–99)
HCT VFR BLD AUTO: 33.6 % (ref 35–47)
HGB BLD-MCNC: 11.5 G/DL (ref 11.7–15.7)
IMM GRANULOCYTES # BLD: 0 10E3/UL
IMM GRANULOCYTES NFR BLD: 0 %
LYMPHOCYTES # BLD AUTO: 0.9 10E3/UL (ref 0.8–5.3)
LYMPHOCYTES NFR BLD AUTO: 39 %
MCH RBC QN AUTO: 32.3 PG (ref 26.5–33)
MCHC RBC AUTO-ENTMCNC: 34.2 G/DL (ref 31.5–36.5)
MCV RBC AUTO: 94 FL (ref 78–100)
MONOCYTES # BLD AUTO: 0.2 10E3/UL (ref 0–1.3)
MONOCYTES NFR BLD AUTO: 9 %
NEUTROPHILS # BLD AUTO: 1.1 10E3/UL (ref 1.6–8.3)
NEUTROPHILS NFR BLD AUTO: 46 %
NRBC # BLD AUTO: 0 10E3/UL
NRBC BLD AUTO-RTO: 0 /100
NT-PROBNP SERPL-MCNC: 162 PG/ML (ref 0–900)
PLATELET # BLD AUTO: 215 10E3/UL (ref 150–450)
POTASSIUM SERPL-SCNC: 3.4 MMOL/L (ref 3.4–5.3)
RBC # BLD AUTO: 3.56 10E6/UL (ref 3.8–5.2)
SODIUM SERPL-SCNC: 142 MMOL/L (ref 135–145)
WBC # BLD AUTO: 2.3 10E3/UL (ref 4–11)

## 2023-10-09 PROCEDURE — 36415 COLL VENOUS BLD VENIPUNCTURE: CPT | Performed by: EMERGENCY MEDICINE

## 2023-10-09 PROCEDURE — 82310 ASSAY OF CALCIUM: CPT | Performed by: EMERGENCY MEDICINE

## 2023-10-09 PROCEDURE — 85025 COMPLETE CBC W/AUTO DIFF WBC: CPT | Performed by: EMERGENCY MEDICINE

## 2023-10-09 PROCEDURE — 250N000013 HC RX MED GY IP 250 OP 250 PS 637: Performed by: EMERGENCY MEDICINE

## 2023-10-09 PROCEDURE — 93970 EXTREMITY STUDY: CPT

## 2023-10-09 PROCEDURE — 83880 ASSAY OF NATRIURETIC PEPTIDE: CPT | Performed by: EMERGENCY MEDICINE

## 2023-10-09 PROCEDURE — 99284 EMERGENCY DEPT VISIT MOD MDM: CPT | Mod: 25

## 2023-10-09 RX ORDER — KETOROLAC TROMETHAMINE 15 MG/ML
15 INJECTION, SOLUTION INTRAMUSCULAR; INTRAVENOUS ONCE
Status: COMPLETED | OUTPATIENT
Start: 2023-10-09 | End: 2023-10-09

## 2023-10-09 RX ORDER — OXYCODONE AND ACETAMINOPHEN 5; 325 MG/1; MG/1
1 TABLET ORAL ONCE
Status: COMPLETED | OUTPATIENT
Start: 2023-10-09 | End: 2023-10-09

## 2023-10-09 RX ADMIN — OXYCODONE HYDROCHLORIDE AND ACETAMINOPHEN 1 TABLET: 5; 325 TABLET ORAL at 12:34

## 2023-10-09 RX ADMIN — OXYCODONE HYDROCHLORIDE AND ACETAMINOPHEN 1 TABLET: 5; 325 TABLET ORAL at 12:35

## 2023-10-09 ASSESSMENT — ACTIVITIES OF DAILY LIVING (ADL)
ADLS_ACUITY_SCORE: 35
ADLS_ACUITY_SCORE: 35
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
## 2023-10-09 NOTE — ED NOTES
"Pt does not want \"anything to do with needles. \"  I spoke with provider about this, we are going to give pain pill. Labs had been drawn last Thursday per pt.   "

## 2023-10-09 NOTE — ED TRIAGE NOTES
Pt c/o of chronic bilateral lower leg edema, Chronic neck, left shoulder pain that radiates to her back. Pt states she did not take any Tylenol or ibuprophen

## 2023-10-11 NOTE — ED PROVIDER NOTES
Patient called back this evening.  Reviewed her ultrasound results along with a BNP.  The patient has a follow-up with her PCP tomorrow.  I recommended she wear compression socks.  I discussed that she did have slight leukopenia which she has had in the past.  I recommended she review this with her PCP.  Discussed leukopenia can be a sign of some types of cancer.  Patient expressed verbal understanding and agreement.     Shady Oakes, DO  10/10/23 6942

## 2023-12-24 ENCOUNTER — HOSPITAL ENCOUNTER (EMERGENCY)
Facility: CLINIC | Age: 48
Discharge: LEFT BEFORE DISCHARGE | End: 2023-12-25
Attending: EMERGENCY MEDICINE | Admitting: EMERGENCY MEDICINE
Payer: COMMERCIAL

## 2023-12-24 VITALS
SYSTOLIC BLOOD PRESSURE: 116 MMHG | DIASTOLIC BLOOD PRESSURE: 76 MMHG | HEART RATE: 91 BPM | TEMPERATURE: 98 F | OXYGEN SATURATION: 98 % | BODY MASS INDEX: 32.49 KG/M2 | HEIGHT: 65 IN | RESPIRATION RATE: 16 BRPM | WEIGHT: 195 LBS

## 2023-12-24 DIAGNOSIS — M54.2 NECK PAIN: ICD-10-CM

## 2023-12-24 PROCEDURE — 99281 EMR DPT VST MAYX REQ PHY/QHP: CPT

## 2023-12-24 ASSESSMENT — ACTIVITIES OF DAILY LIVING (ADL): ADLS_ACUITY_SCORE: 35

## 2023-12-25 ASSESSMENT — ACTIVITIES OF DAILY LIVING (ADL): ADLS_ACUITY_SCORE: 35

## 2023-12-25 NOTE — ED TRIAGE NOTES
Pt complains of left shoulder and neck pain, neck injection on Dec 6, and another one in January, but pt states she can not stand the pain any longer.      Triage Assessment (Adult)       Row Name 12/24/23 6543          Triage Assessment    Airway WDL WDL        Respiratory WDL    Respiratory WDL WDL        Skin Circulation/Temperature WDL    Skin Circulation/Temperature WDL WDL        Cardiac WDL    Cardiac WDL WDL        Peripheral/Neurovascular WDL    Peripheral Neurovascular WDL WDL        Cognitive/Neuro/Behavioral WDL    Cognitive/Neuro/Behavioral WDL WDL

## 2024-06-29 ENCOUNTER — HEALTH MAINTENANCE LETTER (OUTPATIENT)
Age: 49
End: 2024-06-29

## 2025-07-13 ENCOUNTER — HEALTH MAINTENANCE LETTER (OUTPATIENT)
Age: 50
End: 2025-07-13

## (undated) DEVICE — DAVINCI XI DRAPE ARM 470015

## (undated) DEVICE — SOL WATER IRRIG 1000ML BOTTLE 2F7114

## (undated) DEVICE — SU MONOCRYL 4-0 PS-2 18" UND Y496G

## (undated) DEVICE — ENDO TROCAR FIRST ENTRY KII FIOS Z-THRD 05X100MM CTF03

## (undated) DEVICE — SU VICRYL 0 UR-6 27" J603H

## (undated) DEVICE — DRAPE BREAST/CHEST 29420

## (undated) DEVICE — DAVINCI XI CLIP APPLIER MED HEM-O-LOK GREEN 470327

## (undated) DEVICE — POUCH TISSUE RETRIEVAL ROBOTIC 8MM 5.1" INTRO TRS-ROBO-8

## (undated) DEVICE — LIGHT HANDLE X2

## (undated) DEVICE — LUBRICANT INST KIT ENDO-LUBE 220-90

## (undated) DEVICE — SUCTION IRR STRYKERFLOW II W/TIP 250-070-520

## (undated) DEVICE — LINEN TOWEL PACK X5 5464

## (undated) DEVICE — DAVINCI HOT SHEARS TIP COVER  400180

## (undated) DEVICE — SYSTEM CLEARIFY VISUALIZATION 21-345

## (undated) DEVICE — CLIP ENDO HEMO-LOC GREEN MED/LG 544230

## (undated) DEVICE — DAVINCI XI DRAPE COLUMN 470341

## (undated) DEVICE — ESU GROUND PAD UNIVERSAL W/O CORD

## (undated) DEVICE — DAVINCI XI SEAL UNIVERSAL 5-8MM 470361

## (undated) DEVICE — DRAPE SHEET REV FOLD 3/4 9349

## (undated) DEVICE — LUBRICANT INST ELECTROLUBE EL101

## (undated) DEVICE — SU VICRYL 3-0 SH 27" J316H

## (undated) DEVICE — PREP CHLORAPREP 26ML TINTED ORANGE  260815

## (undated) DEVICE — SYR 10ML FINGER CONTROL W/O NDL 309695

## (undated) DEVICE — DAVINCI XI OBTURATOR BLADELESS 8MM 470359

## (undated) DEVICE — ADH SKIN CLOSURE PREMIERPRO EXOFIN 1.0ML 3470

## (undated) DEVICE — PACK LAP CHOLE SLC15LCFSD

## (undated) DEVICE — GLOVE PROTEXIS W/NEU-THERA 6.5  2D73TE65

## (undated) RX ORDER — FENTANYL CITRATE 50 UG/ML
INJECTION, SOLUTION INTRAMUSCULAR; INTRAVENOUS
Status: DISPENSED
Start: 2022-02-24

## (undated) RX ORDER — GLYCOPYRROLATE 0.2 MG/ML
INJECTION, SOLUTION INTRAMUSCULAR; INTRAVENOUS
Status: DISPENSED
Start: 2022-02-24

## (undated) RX ORDER — ONDANSETRON 2 MG/ML
INJECTION INTRAMUSCULAR; INTRAVENOUS
Status: DISPENSED
Start: 2022-02-24

## (undated) RX ORDER — HYDROMORPHONE HYDROCHLORIDE 1 MG/ML
INJECTION, SOLUTION INTRAMUSCULAR; INTRAVENOUS; SUBCUTANEOUS
Status: DISPENSED
Start: 2022-02-24

## (undated) RX ORDER — CEFAZOLIN SODIUM/WATER 2 G/20 ML
SYRINGE (ML) INTRAVENOUS
Status: DISPENSED
Start: 2022-02-24

## (undated) RX ORDER — FENTANYL CITRATE 0.05 MG/ML
INJECTION, SOLUTION INTRAMUSCULAR; INTRAVENOUS
Status: DISPENSED
Start: 2022-02-24

## (undated) RX ORDER — INDOCYANINE GREEN AND WATER 25 MG
KIT INJECTION
Status: DISPENSED
Start: 2022-02-24

## (undated) RX ORDER — DEXAMETHASONE SODIUM PHOSPHATE 4 MG/ML
INJECTION, SOLUTION INTRA-ARTICULAR; INTRALESIONAL; INTRAMUSCULAR; INTRAVENOUS; SOFT TISSUE
Status: DISPENSED
Start: 2022-02-24

## (undated) RX ORDER — KETOROLAC TROMETHAMINE 15 MG/ML
INJECTION, SOLUTION INTRAMUSCULAR; INTRAVENOUS
Status: DISPENSED
Start: 2022-02-24

## (undated) RX ORDER — KETAMINE HCL IN NACL, ISO-OSM 100MG/10ML
SYRINGE (ML) INJECTION
Status: DISPENSED
Start: 2022-02-24

## (undated) RX ORDER — ACETAMINOPHEN 500 MG
TABLET ORAL
Status: DISPENSED
Start: 2022-02-24

## (undated) RX ORDER — LIDOCAINE HYDROCHLORIDE 20 MG/ML
INJECTION, SOLUTION EPIDURAL; INFILTRATION; INTRACAUDAL; PERINEURAL
Status: DISPENSED
Start: 2022-02-24

## (undated) RX ORDER — PROPOFOL 10 MG/ML
INJECTION, EMULSION INTRAVENOUS
Status: DISPENSED
Start: 2022-02-24